# Patient Record
Sex: MALE | Race: BLACK OR AFRICAN AMERICAN | NOT HISPANIC OR LATINO | Employment: OTHER | URBAN - METROPOLITAN AREA
[De-identification: names, ages, dates, MRNs, and addresses within clinical notes are randomized per-mention and may not be internally consistent; named-entity substitution may affect disease eponyms.]

---

## 2018-01-08 PROCEDURE — 93010 ELECTROCARDIOGRAM REPORT: CPT | Mod: ,,, | Performed by: INTERNAL MEDICINE

## 2018-01-08 PROCEDURE — 93005 ELECTROCARDIOGRAM TRACING: CPT

## 2018-01-08 PROCEDURE — 99291 CRITICAL CARE FIRST HOUR: CPT | Mod: 25

## 2018-01-08 PROCEDURE — 82962 GLUCOSE BLOOD TEST: CPT

## 2018-01-09 ENCOUNTER — HOSPITAL ENCOUNTER (INPATIENT)
Facility: HOSPITAL | Age: 78
LOS: 1 days | DRG: 153 | End: 2018-01-10
Attending: EMERGENCY MEDICINE | Admitting: EMERGENCY MEDICINE
Payer: MEDICARE

## 2018-01-09 DIAGNOSIS — J11.1 FLU: ICD-10-CM

## 2018-01-09 DIAGNOSIS — I21.4 NSTEMI (NON-ST ELEVATED MYOCARDIAL INFARCTION): ICD-10-CM

## 2018-01-09 DIAGNOSIS — R50.9 FEVER, UNSPECIFIED FEVER CAUSE: ICD-10-CM
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
## 2018-01-09 DIAGNOSIS — R79.89 ELEVATED TROPONIN: Primary | ICD-10-CM

## 2018-01-09 PROBLEM — I69.320 CVA, OLD, APHASIA: Status: ACTIVE | Noted: 2018-01-09

## 2018-01-09 PROBLEM — J10.1 INFLUENZA A: Status: ACTIVE | Noted: 2018-01-09

## 2018-01-09 PROBLEM — I10 ESSENTIAL HYPERTENSION: Status: ACTIVE | Noted: 2018-01-09

## 2018-01-09 LAB
ALBUMIN SERPL BCP-MCNC: 3.7 G/DL
ALP SERPL-CCNC: 94 U/L
ALT SERPL W/O P-5'-P-CCNC: 23 U/L
ANION GAP SERPL CALC-SCNC: 14 MMOL/L
AST SERPL-CCNC: 61 U/L
BASOPHILS # BLD AUTO: 0.01 K/UL
BASOPHILS NFR BLD: 0.1 %
BILIRUB SERPL-MCNC: 0.4 MG/DL
BUN SERPL-MCNC: 17 MG/DL
CALCIUM SERPL-MCNC: 9.8 MG/DL
CHLORIDE SERPL-SCNC: 105 MMOL/L
CHOLEST SERPL-MCNC: 114 MG/DL
CHOLEST/HDLC SERPL: 3 {RATIO}
CO2 SERPL-SCNC: 21 MMOL/L
CREAT SERPL-MCNC: 1.2 MG/DL
DIASTOLIC DYSFUNCTION: NO
DIFFERENTIAL METHOD: ABNORMAL
EOSINOPHIL # BLD AUTO: 0 K/UL
EOSINOPHIL NFR BLD: 0.1 %
ERYTHROCYTE [DISTWIDTH] IN BLOOD BY AUTOMATED COUNT: 15.3 %
EST. GFR  (AFRICAN AMERICAN): >60 ML/MIN/1.73 M^2
EST. GFR  (NON AFRICAN AMERICAN): 57 ML/MIN/1.73 M^2
ESTIMATED AVG GLUCOSE: 140 MG/DL
ESTIMATED PA SYSTOLIC PRESSURE: 35.67
FLUAV AG SPEC QL IA: POSITIVE
FLUBV AG SPEC QL IA: NEGATIVE
GLOBAL PERICARDIAL EFFUSION: NORMAL
GLUCOSE SERPL-MCNC: 221 MG/DL
HBA1C MFR BLD HPLC: 6.5 %
HCT VFR BLD AUTO: 35.6 %
HDLC SERPL-MCNC: 38 MG/DL
HDLC SERPL: 33.3 %
HGB BLD-MCNC: 11.7 G/DL
INR PPP: 1.1
LACTATE SERPL-SCNC: 3.4 MMOL/L
LDLC SERPL CALC-MCNC: 61.2 MG/DL
LYMPHOCYTES # BLD AUTO: 0.6 K/UL
LYMPHOCYTES NFR BLD: 8.3 %
MAGNESIUM SERPL-MCNC: 1.6 MG/DL
MCH RBC QN AUTO: 28 PG
MCHC RBC AUTO-ENTMCNC: 32.9 G/DL
MCV RBC AUTO: 85 FL
MITRAL VALVE MOBILITY: NORMAL
MONOCYTES # BLD AUTO: 0.9 K/UL
MONOCYTES NFR BLD: 11.9 %
NEUTROPHILS # BLD AUTO: 5.9 K/UL
NEUTROPHILS NFR BLD: 79.6 %
NONHDLC SERPL-MCNC: 76 MG/DL
PLATELET # BLD AUTO: 206 K/UL
PMV BLD AUTO: 10.1 FL
POCT GLUCOSE: 140 MG/DL (ref 70–110)
POCT GLUCOSE: 170 MG/DL (ref 70–110)
POCT GLUCOSE: 204 MG/DL (ref 70–110)
POCT GLUCOSE: 204 MG/DL (ref 70–110)
POCT GLUCOSE: 216 MG/DL (ref 70–110)
POCT GLUCOSE: 233 MG/DL (ref 70–110)
POTASSIUM SERPL-SCNC: 4.4 MMOL/L
PROT SERPL-MCNC: 7.7 G/DL
PROTHROMBIN TIME: 12 SEC
RBC # BLD AUTO: 4.18 M/UL
RETIRED EF AND QEF - SEE NOTES: 60 (ref 55–65)
SODIUM SERPL-SCNC: 140 MMOL/L
SPECIMEN SOURCE: ABNORMAL
TRICUSPID VALVE REGURGITATION: NORMAL
TRIGL SERPL-MCNC: 74 MG/DL
TROPONIN I SERPL DL<=0.01 NG/ML-MCNC: 0.15 NG/ML
TROPONIN I SERPL DL<=0.01 NG/ML-MCNC: 0.22 NG/ML
TROPONIN I SERPL DL<=0.01 NG/ML-MCNC: 0.22 NG/ML
WBC # BLD AUTO: 7.38 K/UL

## 2018-01-09 PROCEDURE — 87040 BLOOD CULTURE FOR BACTERIA: CPT

## 2018-01-09 PROCEDURE — 96374 THER/PROPH/DIAG INJ IV PUSH: CPT

## 2018-01-09 PROCEDURE — 96361 HYDRATE IV INFUSION ADD-ON: CPT

## 2018-01-09 PROCEDURE — 92610 EVALUATE SWALLOWING FUNCTION: CPT

## 2018-01-09 PROCEDURE — 63600175 PHARM REV CODE 636 W HCPCS: Performed by: EMERGENCY MEDICINE

## 2018-01-09 PROCEDURE — 83605 ASSAY OF LACTIC ACID: CPT

## 2018-01-09 PROCEDURE — G8996 SWALLOW CURRENT STATUS: HCPCS | Mod: CI

## 2018-01-09 PROCEDURE — G8998 SWALLOW D/C STATUS: HCPCS | Mod: CI

## 2018-01-09 PROCEDURE — 25000003 PHARM REV CODE 250: Performed by: HOSPITALIST

## 2018-01-09 PROCEDURE — 80061 LIPID PANEL: CPT

## 2018-01-09 PROCEDURE — 93306 TTE W/DOPPLER COMPLETE: CPT

## 2018-01-09 PROCEDURE — 93010 ELECTROCARDIOGRAM REPORT: CPT | Mod: ,,, | Performed by: INTERNAL MEDICINE

## 2018-01-09 PROCEDURE — 36415 COLL VENOUS BLD VENIPUNCTURE: CPT

## 2018-01-09 PROCEDURE — 84484 ASSAY OF TROPONIN QUANT: CPT

## 2018-01-09 PROCEDURE — 83735 ASSAY OF MAGNESIUM: CPT

## 2018-01-09 PROCEDURE — 25000003 PHARM REV CODE 250: Performed by: EMERGENCY MEDICINE

## 2018-01-09 PROCEDURE — 85025 COMPLETE CBC W/AUTO DIFF WBC: CPT

## 2018-01-09 PROCEDURE — 99223 1ST HOSP IP/OBS HIGH 75: CPT | Mod: ,,, | Performed by: INTERNAL MEDICINE

## 2018-01-09 PROCEDURE — 21400001 HC TELEMETRY ROOM

## 2018-01-09 PROCEDURE — 83036 HEMOGLOBIN GLYCOSYLATED A1C: CPT

## 2018-01-09 PROCEDURE — 25000003 PHARM REV CODE 250: Performed by: INTERNAL MEDICINE

## 2018-01-09 PROCEDURE — 85610 PROTHROMBIN TIME: CPT

## 2018-01-09 PROCEDURE — 82962 GLUCOSE BLOOD TEST: CPT

## 2018-01-09 PROCEDURE — 93306 TTE W/DOPPLER COMPLETE: CPT | Mod: 26,,, | Performed by: INTERNAL MEDICINE

## 2018-01-09 PROCEDURE — 87400 INFLUENZA A/B EACH AG IA: CPT

## 2018-01-09 PROCEDURE — G8997 SWALLOW GOAL STATUS: HCPCS | Mod: CI

## 2018-01-09 PROCEDURE — 93005 ELECTROCARDIOGRAM TRACING: CPT

## 2018-01-09 PROCEDURE — 80053 COMPREHEN METABOLIC PANEL: CPT

## 2018-01-09 RX ORDER — INSULIN ASPART 100 [IU]/ML
0-5 INJECTION, SOLUTION INTRAVENOUS; SUBCUTANEOUS
Status: DISCONTINUED | OUTPATIENT
Start: 2018-01-09 | End: 2018-01-10 | Stop reason: HOSPADM

## 2018-01-09 RX ORDER — IBUPROFEN 200 MG
16 TABLET ORAL
Status: DISCONTINUED | OUTPATIENT
Start: 2018-01-09 | End: 2018-01-10 | Stop reason: HOSPADM

## 2018-01-09 RX ORDER — FERROUS GLUCONATE 324(38)MG
324 TABLET ORAL
COMMUNITY

## 2018-01-09 RX ORDER — METOPROLOL TARTRATE 25 MG/1
25 TABLET, FILM COATED ORAL 2 TIMES DAILY
Status: DISCONTINUED | OUTPATIENT
Start: 2018-01-09 | End: 2018-01-10 | Stop reason: HOSPADM

## 2018-01-09 RX ORDER — ACETAMINOPHEN 325 MG/1
650 TABLET ORAL EVERY 6 HOURS PRN
Status: DISCONTINUED | OUTPATIENT
Start: 2018-01-09 | End: 2018-01-10 | Stop reason: HOSPADM

## 2018-01-09 RX ORDER — ATORVASTATIN CALCIUM 40 MG/1
40 TABLET, FILM COATED ORAL DAILY
Status: DISCONTINUED | OUTPATIENT
Start: 2018-01-09 | End: 2018-01-10 | Stop reason: HOSPADM

## 2018-01-09 RX ORDER — OSELTAMIVIR PHOSPHATE 75 MG/1
75 CAPSULE ORAL
Status: COMPLETED | OUTPATIENT
Start: 2018-01-09 | End: 2018-01-09

## 2018-01-09 RX ORDER — ASPIRIN 81 MG/1
81 TABLET ORAL DAILY
Status: DISCONTINUED | OUTPATIENT
Start: 2018-01-09 | End: 2018-01-10 | Stop reason: HOSPADM

## 2018-01-09 RX ORDER — TRAZODONE HYDROCHLORIDE 50 MG/1
50 TABLET ORAL NIGHTLY
Status: DISCONTINUED | OUTPATIENT
Start: 2018-01-09 | End: 2018-01-10 | Stop reason: HOSPADM

## 2018-01-09 RX ORDER — ASPIRIN 81 MG/1
81 TABLET ORAL DAILY
COMMUNITY

## 2018-01-09 RX ORDER — CLONIDINE HYDROCHLORIDE 0.1 MG/1
0.1 TABLET ORAL EVERY 4 HOURS PRN
Status: DISCONTINUED | OUTPATIENT
Start: 2018-01-09 | End: 2018-01-10 | Stop reason: HOSPADM

## 2018-01-09 RX ORDER — ASPIRIN 325 MG
325 TABLET ORAL
Status: COMPLETED | OUTPATIENT
Start: 2018-01-09 | End: 2018-01-09

## 2018-01-09 RX ORDER — ACETAMINOPHEN 160 MG/5ML
650 SOLUTION ORAL
Status: COMPLETED | OUTPATIENT
Start: 2018-01-09 | End: 2018-01-09

## 2018-01-09 RX ORDER — GUAIFENESIN 100 MG/5ML
200 SOLUTION ORAL EVERY 4 HOURS PRN
Status: DISCONTINUED | OUTPATIENT
Start: 2018-01-09 | End: 2018-01-10 | Stop reason: HOSPADM

## 2018-01-09 RX ORDER — TRAZODONE HYDROCHLORIDE 50 MG/1
50 TABLET ORAL NIGHTLY
COMMUNITY

## 2018-01-09 RX ORDER — OSELTAMIVIR PHOSPHATE 75 MG/1
75 CAPSULE ORAL DAILY
Status: DISCONTINUED | OUTPATIENT
Start: 2018-01-09 | End: 2018-01-10 | Stop reason: HOSPADM

## 2018-01-09 RX ORDER — SODIUM CHLORIDE 9 MG/ML
1000 INJECTION, SOLUTION INTRAVENOUS
Status: COMPLETED | OUTPATIENT
Start: 2018-01-09 | End: 2018-01-09

## 2018-01-09 RX ORDER — GLUCAGON 1 MG
1 KIT INJECTION
Status: DISCONTINUED | OUTPATIENT
Start: 2018-01-09 | End: 2018-01-10 | Stop reason: HOSPADM

## 2018-01-09 RX ORDER — IBUPROFEN 200 MG
24 TABLET ORAL
Status: DISCONTINUED | OUTPATIENT
Start: 2018-01-09 | End: 2018-01-10 | Stop reason: HOSPADM

## 2018-01-09 RX ORDER — LISINOPRIL 5 MG/1
10 TABLET ORAL DAILY
Status: DISCONTINUED | OUTPATIENT
Start: 2018-01-09 | End: 2018-01-10 | Stop reason: HOSPADM

## 2018-01-09 RX ORDER — AMLODIPINE BESYLATE 5 MG/1
10 TABLET ORAL DAILY
Status: DISCONTINUED | OUTPATIENT
Start: 2018-01-09 | End: 2018-01-10 | Stop reason: HOSPADM

## 2018-01-09 RX ORDER — WARFARIN 2.5 MG/1
2.5 TABLET ORAL
Status: DISCONTINUED | OUTPATIENT
Start: 2018-01-10 | End: 2018-01-10 | Stop reason: HOSPADM

## 2018-01-09 RX ORDER — LABETALOL HYDROCHLORIDE 5 MG/ML
10 INJECTION, SOLUTION INTRAVENOUS
Status: COMPLETED | OUTPATIENT
Start: 2018-01-09 | End: 2018-01-09

## 2018-01-09 RX ORDER — BENZONATATE 100 MG/1
200 CAPSULE ORAL ONCE
Status: COMPLETED | OUTPATIENT
Start: 2018-01-09 | End: 2018-01-09

## 2018-01-09 RX ADMIN — BENZONATATE 200 MG: 100 CAPSULE ORAL at 04:01

## 2018-01-09 RX ADMIN — ATORVASTATIN CALCIUM 40 MG: 40 TABLET, FILM COATED ORAL at 05:01

## 2018-01-09 RX ADMIN — INSULIN ASPART 2 UNITS: 100 INJECTION, SOLUTION INTRAVENOUS; SUBCUTANEOUS at 06:01

## 2018-01-09 RX ADMIN — SODIUM CHLORIDE 1000 ML: 0.9 INJECTION, SOLUTION INTRAVENOUS at 01:01

## 2018-01-09 RX ADMIN — ACETAMINOPHEN 650 MG: 325 TABLET ORAL at 08:01

## 2018-01-09 RX ADMIN — ASPIRIN 81 MG: 81 TABLET, COATED ORAL at 09:01

## 2018-01-09 RX ADMIN — TRAZODONE HYDROCHLORIDE 50 MG: 50 TABLET ORAL at 08:01

## 2018-01-09 RX ADMIN — ACETAMINOPHEN 649.6 MG: 160 SOLUTION ORAL at 01:01

## 2018-01-09 RX ADMIN — SODIUM CHLORIDE 1000 ML: 0.9 INJECTION, SOLUTION INTRAVENOUS at 06:01

## 2018-01-09 RX ADMIN — LISINOPRIL 10 MG: 5 TABLET ORAL at 08:01

## 2018-01-09 RX ADMIN — OSELTAMIVIR PHOSPHATE 75 MG: 75 CAPSULE ORAL at 04:01

## 2018-01-09 RX ADMIN — LABETALOL HYDROCHLORIDE 10 MG: 5 INJECTION, SOLUTION INTRAVENOUS at 01:01

## 2018-01-09 RX ADMIN — AMLODIPINE BESYLATE 10 MG: 5 TABLET ORAL at 08:01

## 2018-01-09 RX ADMIN — METOPROLOL TARTRATE 25 MG: 25 TABLET ORAL at 08:01

## 2018-01-09 RX ADMIN — ASPIRIN 325 MG ORAL TABLET 325 MG: 325 PILL ORAL at 04:01

## 2018-01-09 NOTE — H&P
[facility] Medicine  History & Physical    Patient Name: Mani Kong  MRN: [id number]  Admission Date: 1/9/2018  Attending Physician: Vin Dewey MD   Primary Care Provider: Chaz Shepherd Jr, MD (Inactive)         Patient information was obtained from patient and ER records.     Subjective:     Principal Problem:Elevated troponin    Chief Complaint:   Chief Complaint   Patient presents with    Fall     Family reports patient fell at nursing home today and hit his head. Pt has a small cut to right forhead. Denies LOC. Also c/o dry cough x 1 mo, and fever, loss of appetite, weakness x 2 days. Pt denies head pain. Pt fell off bed. Pt is paralyze on right side due to stroke. Pt unable to speak         HPI: This 77 y.o M with BRAO, DM type II, HTN and PMHx of stroke presents to the ED from [facility] accompanied by a family member for emergent evaluation of a fall x2 PTA. 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 The pt fell again at approximately 1900, resulting in bruising to the left side chest and small laceration to the right side forehead. The pt's family member reports a fever of 103.0 F PTA. Additionally, the pt's family member reports a gradually worsening cough x1 month. The pt denies LOC, chest pain,SOB,diaphoresis, V/D and headache. No prior tx.he has 0.151 with no chest pain,EKG show non specific T wave changes,he has al;so positive influenza and has been started on Tamiflu.chest X ray show no consolidation.    Past Medical History:   Diagnosis Date    BRAO (branch retinal artery occlusion)     OS    Diabetes mellitus type II     Diabetic macular edema of right eye     Diabetic retinopathy     Encounter for blood transfusion     Glaucoma     High cholesterol     Hypertension     Stroke        Past Surgical History:   Procedure Laterality Date    BACK SURGERY       "CATARACT EXTRACTION W/  INTRAOCULAR LENS IMPLANT Bilateral     PANRETINAL PHOTOCOAGULATION      Right eye x 2 2/27/12 & 3/26/12       Review of patient's allergies indicates:  No Known Allergies    No current facility-administered medications on file prior to encounter.      Current Outpatient Prescriptions on File Prior to Encounter   Medication Sig    amlodipine (NORVASC) 10 MG tablet Take 1 tablet (10 mg total) by mouth once daily.    atorvastatin (LIPITOR) 40 MG tablet Take 1 tablet (40 mg total) by mouth once daily.    metformin (GLUCOPHAGE) 1000 MG tablet Take 1 tablet (1,000 mg total) by mouth 2 (two) times daily.    blood sugar diagnostic Strp Use with Contour Next EZ machine. Test three times daily.    cetirizine (ZYRTEC) 5 MG tablet Take 1 tablet (5 mg total) by mouth once daily.    insulin aspart (NOVOLOG) 100 unit/mL injection Inject 8 units subq TID with meals and ACHS per sliding scale: 0 - 60:OJ or glucose tablet;=No insulin;201-250=2 units;251-300=4 units;301-350=6 units;351-400=8 units;>400=10 units then call MD    insulin glargine (LANTUS SOLOSTAR) 100 unit/mL (3 mL) InPn pen Inject 12 Units into the skin every evening. Take twenty units every evening    insulin needles, disposable, (BD ULTRA-FINE CHETNA PEN NEEDLES) 32 x 5/32 " Ndle Use with flex pens before meals    insulin needles, disposable, (BD ULTRA-FINE CHETNA PEN NEEDLES) 32 x 5/32 " Ndle Use with flexpen    lancets (LANCETS,ULTRA THIN) Misc Use with Contour Next EZ machine. Test three times daily    lisinopril 10 MG tablet Take 1 tablet (10 mg total) by mouth once daily.    SYRINGE W-NEEDLE,DISPOSAB,1ML (SYRINGE WITH NEEDLE, DISP,) 1 mL 29 x 1/2" Syrg Use as directed for insulin dosage before meals and at bedtime    warfarin (COUMADIN) 5 MG tablet Take 1 & 1/2 tab (7.5mg) by mouth Mon-Wed-Fri and 1 tab (5mg) all other days of the week as directed by [facility]     Family History     Problem Relation (Age of Onset)    " Diabetes Sister, Sister, Maternal Grandmother        Social History Main Topics    Smoking status: Former Smoker     Quit date: 10/10/2000    Smokeless tobacco: Never Used    Alcohol use 0.5 oz/week     1 drink(s) per week      Comment: very seldom    Drug use: No    Sexual activity: Not on file     Review of Systems   Constitutional: Positive for activity change and fever.   HENT: Negative for congestion and dental problem.    Eyes: Negative for discharge and itching.   Respiratory: Negative for apnea and chest tightness.    Cardiovascular: Negative for chest pain and palpitations.   Gastrointestinal: Negative for abdominal distention and abdominal pain.   Endocrine: Negative for cold intolerance and heat intolerance.   Genitourinary: Negative for difficulty urinating and dysuria.   Musculoskeletal: Negative for arthralgias and back pain.   Skin: Negative for color change and pallor.   Allergic/Immunologic: Negative for environmental allergies and food allergies.   Neurological: Negative for dizziness.   Hematological: Negative for adenopathy. Does not bruise/bleed easily.   Psychiatric/Behavioral: Negative for agitation and behavioral problems.     Objective:     Vital Signs (Most Recent):  Temp: 100.3 °F (37.9 °C) (01/09/18 0555)  Pulse: 91 (01/09/18 0555)  Resp: 16 (01/08/18 2131)  BP: (!) 126/58 (01/09/18 0555)  SpO2: 96 % (01/09/18 0555) Vital Signs (24h Range):  Temp:  [98.2 °F (36.8 °C)-101.9 °F (38.8 °C)] 100.3 °F (37.9 °C)  Pulse:  [] 91  Resp:  [16] 16  SpO2:  [96 %-100 %] 96 %  BP: (126-178)/(58-79) 126/58     Weight: 67.8 kg (149 lb 7.6 oz)  Body mass index is 23.41 kg/m².    Physical Exam   HENT:   Head: Atraumatic.   Eyes: EOM are normal. Pupils are equal, round, and reactive to light.   Neck: Normal range of motion. Neck supple.   Cardiovascular: Normal rate and regular rhythm.    Pulmonary/Chest: Effort normal and breath sounds normal.   Abdominal: Soft.   Musculoskeletal: Normal range of  motion.   Neurological: He is alert.   Chronic right side paralysis   Skin: Skin is dry. He is not diaphoretic.   Psychiatric: He has a normal mood and affect. His behavior is normal.         CRANIAL NERVES     CN III, IV, VI   Pupils are equal, round, and reactive to light.  Extraocular motions are normal.        Significant Labs:   BMP:   Recent Labs  Lab 01/09/18  0055   *      K 4.4      CO2 21*   BUN 17   CREATININE 1.2   CALCIUM 9.8   MG 1.6     CBC:   Recent Labs  Lab 01/09/18  0130   WBC 7.38   HGB 11.7*   HCT 35.6*        Troponin:   Recent Labs  Lab 01/09/18  0055   TROPONINI 0.151*       Significant Imaging: reviewed.    Assessment/Plan:     * Elevated troponin      suspect duo to fall,he denies chest pain and EKG show only non specific T wave changes,trend cardiac marks,Echo,cardiology has been consulted.        Essential hypertension      Resume home medication,prn clonidine.        Uncontrolled type 2 diabetes mellitus with retinopathy    On SSI.          CVA, old, aphasia    As above.        Influenza A      On Tamiflu,not hypoxic,stable.        Hyperlipidemia    On statin.          Anticoagulation monitoring, INR range 2-3      Subtherapeutic,will monitor.        Hemiparesis, aphasia, and dysphagia as late effects of stroke    Right hemiparesis,on secondary prevention,will do ST,aspiration precaution.          Debility    Nursing home resident,duo to old CVA,.          Aphasia    Chronic,          Branch retinal artery occlusion      Supportive care,follow with ophthalmology.        Nonproliferative diabetic retinopathy    Supportive care.            VTE Risk Mitigation         Ordered     warfarin (COUMADIN) tablet 2.5 mg  Every Mon, Wed, Fri     Route:  Oral        01/09/18 0603     High Risk of VTE  Once      01/09/18 0603     Place ANTONIETA hose  Until discontinued      01/09/18 0603     Place sequential compression device  Until discontinued      01/09/18 0603              Vin Dewey MD  Department of Hospital Medicine   [facility]

## 2018-01-09 NOTE — ED PROVIDER NOTES
Encounter Date: 1/8/2018    SCRIBE #1 NOTE: I, Venu Laws, am scribing for, and in the presence of,  Chris Bravo MD. I have scribed the following portions of the note - Other sections scribed: HPI and ROS.       History     Chief Complaint   Patient presents with    Fall     Family reports patient fell at nursing home today and hit his head. Pt has a small cut to right forhead. Denies LOC. Also c/o dry cough x 1 mo, and fever, loss of appetite, weakness x 2 days. Pt denies head pain. Pt fell off bed. Pt is paralyze on right side due to stroke. Pt unable to speak      CC: Fall     HPI: This 77 y.o M with BRAO, DM type II, HTN and PMHx of stroke presents to the ED from [facility] accompanied by a family member for emergent evaluation of a fall x2 PTA. 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 The pt fell again at approximately 1900, resulting in bruising to the left side chest and small laceration to the right side forehead. The pt's family member reports a fever of 103.0 F PTA. Additionally, the pt's family member reports a gradually worsening cough x1 month. The pt denies LOC, diaphoresis, V/D and headache. No prior tx.       The history is provided by a relative. No  was used.     Review of patient's allergies indicates:  No Known Allergies  Past Medical History:   Diagnosis Date    BRAO (branch retinal artery occlusion)     OS    Diabetes mellitus type II     Diabetic macular edema of right eye     Diabetic retinopathy     Encounter for blood transfusion     Glaucoma     High cholesterol     Hypertension     Stroke      Past Surgical History:   Procedure Laterality Date    BACK SURGERY      CATARACT EXTRACTION W/  INTRAOCULAR LENS IMPLANT Bilateral     PANRETINAL PHOTOCOAGULATION      Right eye x 2 2/27/12 & 3/26/12     Family History   Problem Relation Age of Onset    Diabetes  Sister     Diabetes Sister     Diabetes Maternal Grandmother     Glaucoma Neg Hx     Blindness Neg Hx     Macular degeneration Neg Hx     Retinal detachment Neg Hx      Social History   Substance Use Topics    Smoking status: Former Smoker     Quit date: 10/10/2000    Smokeless tobacco: Never Used    Alcohol use 0.5 oz/week     1 drink(s) per week      Comment: very seldom     Review of Systems   Constitutional: Positive for fever. Negative for chills.   HENT: Negative for rhinorrhea and sore throat.    Eyes: Negative for redness.   Respiratory: Positive for cough.    Cardiovascular: Negative for chest pain.   Gastrointestinal: Negative for abdominal pain, diarrhea, nausea and vomiting.   Genitourinary: Negative for dysuria.   Musculoskeletal: Negative for back pain.   Skin: Negative for color change.        (+) L side chest bruising   Neurological: Negative for syncope and headaches.   Psychiatric/Behavioral: The patient is not nervous/anxious.        Physical Exam     Initial Vitals [01/08/18 2131]   BP Pulse Resp Temp SpO2   (!) 175/79 110 16 98.2 °F (36.8 °C) 99 %      MAP       111         Physical Exam    Vitals reviewed.  Constitutional: He appears well-developed and well-nourished.   HENT:   Head: Normocephalic and atraumatic.   Eyes: EOM are normal. Pupils are equal, round, and reactive to light.   Neck: Normal range of motion. Neck supple.   Cardiovascular: Normal rate, regular rhythm, normal heart sounds and intact distal pulses.   Pulmonary/Chest: Breath sounds normal. No respiratory distress. He has no wheezes. He has no rhonchi. He has no rales.   Patient has a large anterior left chest wall contusion with bruising.   Abdominal: Soft. Bowel sounds are normal.   Musculoskeletal: Normal range of motion.   Neurological: He is alert and oriented to person, place, and time.   Skin: Skin is warm and dry.   Psychiatric: He has a normal mood and affect. He is noncommunicative.   Patient is essentially  nonverbal but is awake, alert and oriented and seems to follow line conversations and will shake his head yes and no to questions.         ED Course   Critical Care  Date/Time: 1/9/2018 2:51 AM  Performed by: GABRIELLE MCKEON  Authorized by: GABRIELLE MCKEON   Total critical care time (exclusive of procedural time) : 43 minutes  Critical care was necessary to treat or prevent imminent or life-threatening deterioration of the following conditions: sepsis and cardiac failure.  Critical care was time spent personally by me on the following activities: review of old charts, re-evaluation of patient's condition, pulse oximetry, ordering and review of radiographic studies, ordering and review of laboratory studies, obtaining history from patient or surrogate, ordering and performing treatments and interventions, examination of patient, evaluation of patient's response to treatment, discussions with consultants and development of treatment plan with patient or surrogate.        Labs Reviewed   CBC W/ AUTO DIFFERENTIAL - Abnormal; Notable for the following:        Result Value    RBC 4.18 (*)     Hemoglobin 11.7 (*)     Hematocrit 35.6 (*)     RDW 15.3 (*)     Lymph # 0.6 (*)     Gran% 79.6 (*)     Lymph% 8.3 (*)     All other components within normal limits   COMPREHENSIVE METABOLIC PANEL - Abnormal; Notable for the following:     CO2 21 (*)     Glucose 221 (*)     AST 61 (*)     eGFR if non  57 (*)     All other components within normal limits   TROPONIN I - Abnormal; Notable for the following:     Troponin I 0.151 (*)     All other components within normal limits   INFLUENZA A AND B ANTIGEN - Abnormal; Notable for the following:     Influenza A Ag, EIA Positive (*)     All other components within normal limits   LACTIC ACID, PLASMA - Abnormal; Notable for the following:     Lactate (Lactic Acid) 3.4 (*)     All other components within normal limits   POCT GLUCOSE - Abnormal; Notable for the  following:     POCT Glucose 233 (*)     All other components within normal limits   POCT GLUCOSE - Abnormal; Notable for the following:     POCT Glucose 216 (*)     All other components within normal limits   CULTURE, BLOOD   CULTURE, BLOOD   PROTIME-INR   MAGNESIUM   URINALYSIS   POCT GLUCOSE MONITORING CONTINUOUS     EKG Readings: (Independently Interpreted)   Rhythm: Normal Sinus Rhythm. Ectopy: No Ectopy. Conduction: Normal. ST Segments: Normal ST Segments. T Waves: Normal. Clinical Impression: Normal Sinus Rhythm       X-Rays:   Independently Interpreted Readings:   Chest X-Ray: Normal heart size.  No infiltrates.  No acute abnormalities.   Head CT: No hemorrhage.  No skull fracture.  No acute stroke.     Medical Decision Making:   History:   Old Medical Records: I decided to obtain old medical records.  Initial Assessment:   Medical decision-making:    The patient received a medical screening exam. If performed, the EKG was independently evaluated by me and is pending final cardiology evaluation.  If performed, all radiographic studies were independently evaluated by me and are pending final radiology evaluation. If labs were ordered, they were reviewed. Vital signs are independently assessed by me.  If performed, the pulse oximetry was independently evaluated by me.  I decided to obtain the patient's past medical record.  If available, I reviewed the patient's past medical record, including most recent labs and radiology reports.    ED Management:  Patient presents to the emergency department for evaluation of the nursing home after a fall while trying to transition from his wheelchair to the bed.  Patient is on Coumadin.  Patient has a normal head CT.  There is no evidence for intracranial hemorrhage or skull fracture.  Patient was found to be febrile.  His influenza test is positive.  Will start Tamiflu.  The patient has a large anterior chest wall contusion.  The patient has a normal EKG, but a grossly  elevated troponin.  Patient shakes his no when we ask him if he is an active chest pain.  This may be due to NSTEMI versus cardiac contusion.  We will admit the patient for serial cardiac markers.  We'll give the patient aspirin.  He is on Coumadin.  Beta blocker, ACE inhibitor and Plavix at the discretion of primary care and cardiology.  Chest x-ray shows no sign of pneumothorax or large focal consolidation or pneumonia.    I discussed the patient's presentation and workup with the hospitalist who agrees with placing the patient in the hospital.  I have placed orders for the hospitalist.   The results and physical exam findings were reviewed with the patient. Pt agrees with assessment, disposition and treatment plan and has no further questions or complaints at this time.    PREETHI Bravo M.D. 2:51 AM 1/9/2018          Imaging Results          X-Ray Chest 1 View (Final result)  Result time 01/09/18 01:31:35    Final result by Walter Lucero MD (01/09/18 01:31:35)                 Impression:      No acute cardiopulmonary process identified.      Electronically signed by: WALTER LUCERO MD  Date:     01/09/18  Time:    01:31              Narrative:    Chest AP single view.  Comparison: 2/2015.    Cardiac silhouette is normal in size.  Mediastinal structures are midline.  Lungs are symmetrically expanded.  No evidence of focal consolidative process, pneumothorax, or significant effusion.  Bones show DJD.  No free air visualized beneath the diaphragm.                             CT Head Without Contrast (Final result)  Result time 01/09/18 01:28:34    Final result by Walter Lucero MD (01/09/18 01:28:34)                 Impression:       No acute intracranial abnormalities identified.  Remote infarction involving the left frontal lobe and basal ganglia.      Electronically signed by: WALTER LUCERO MD  Date:     01/09/18  Time:    01:28              Narrative:    Exam: CT of the head without contrast -- 77  year-old male status post fall.    Comparison: CT head 2/2015.    Technique: 5 mm noncontrast axial images through the brain were obtained.    Findings: There is generalized cerebral volume loss and mild chronic microvascular ischemic disease.  Area of remote infarction is again seen involving the left frontal lobe and basal ganglia.  No evidence of acute/recent major vascular distribution cerebral infarction, intraparenchymal hemorrhage, or intra-axial space occupying lesion. The ventricular system is normal in size and configuration with no evidence of hydrocephalus. No effacement of the skull-base cisterns. No abnormal extra-axial fluid collections or blood products.  Left frontal and ethmoid sinus disease is noted. The calvarium shows no significant abnormality.                            Not be       Scribe Attestation:   Scribe #1: I performed the above scribed service and the documentation accurately describes the services I performed. I attest to the accuracy of the note.    Attending Attestation:           Physician Attestation for Scribe:  Physician Attestation Statement for Scribe #1: I, Chris Bravo MD, reviewed documentation, as scribed by Venu Laws in my presence, and it is both accurate and complete.                 ED Course      Clinical Impression:   The primary encounter diagnosis was NSTEMI (non-ST elevated myocardial infarction). Diagnoses of Fall, Flu, and Fever, unspecified fever cause were also pertinent to this visit.                           Chris Bravo MD  01/09/18 0710

## 2018-01-09 NOTE — PLAN OF CARE
01/09/18 1456   Discharge Assessment   Assessment Type Discharge Planning Assessment   Confirmed/corrected address and phone number on facesheet? Yes  (pt is resident of [facility])   Assessment information obtained from? (pts lis Vanegas)   Prior to hospitilization cognitive status: (pt is aphasic)   Prior to hospitalization functional status: Assistive Equipment;Needs Assistance   Current cognitive status: Alert/Oriented  (pt aphasic)   Current Functional Status: Assistive Equipment;Needs Assistance   Facility Arrived From: [facility] With facility resident  ([facility] )   Able to Return to Prior Arrangements yes   Is patient able to care for self after discharge? Yes   Who are your caregiver(s) and their phone number(s)? lis Vanegas- [phone]   Patient's perception of discharge disposition nursing home   Readmission Within The Last 30 Days no previous admission in last 30 days   Patient currently being followed by outpatient case management? No   Patient currently receives any other outside agency services? No   Equipment Currently Used at Home (facility equipment)   Do you have any problems affording any of your prescribed medications? No   Is the patient taking medications as prescribed? yes   Does the patient have transportation home? Yes   Transportation Available agency transportation;family or friend will provide   Does the patient receive services at the [facility]? No   Discharge Plan A Return to nursing home  ([facility])   Discharge Plan B Return to Nursing Home   Patient/Family In Agreement With Plan yes   Does the patient have transportation to healthcare appointments? Yes     [facility] [address] [place] [address], [place] - [address] [facility]  [address] [facility] [address]  Phone: [phone] Fax: [phone]    Assessment completed with pts lis Vanegas who informed TN that the pt will be returning back to [facility] at time of discharge. No additional questions or  concerns TN name and contact information provided to pts daughter.

## 2018-01-09 NOTE — ASSESSMENT & PLAN NOTE
Likely demand is setting of fever/flu, doubt ACS  Check echo  Agree with med rx  No plan for ischemia eval/stress testing (I do not think the pt is an appropriate candidate for revasc given his dementia)

## 2018-01-09 NOTE — ASSESSMENT & PLAN NOTE
suspect duo to fall,he denies chest pain and EKG show only non specific T wave changes,trend cardiac marks,Echo,cardiology has been consulted.

## 2018-01-09 NOTE — SUBJECTIVE & OBJECTIVE
"Past Medical History:   Diagnosis Date    BRAO (branch retinal artery occlusion)     OS    Diabetes mellitus type II     Diabetic macular edema of right eye     Diabetic retinopathy     Encounter for blood transfusion     Glaucoma     High cholesterol     Hypertension     Stroke        Past Surgical History:   Procedure Laterality Date    BACK SURGERY      CATARACT EXTRACTION W/  INTRAOCULAR LENS IMPLANT Bilateral     PANRETINAL PHOTOCOAGULATION      Right eye x 2 2/27/12 & 3/26/12       Review of patient's allergies indicates:  No Known Allergies    No current facility-administered medications on file prior to encounter.      Current Outpatient Prescriptions on File Prior to Encounter   Medication Sig    amlodipine (NORVASC) 10 MG tablet Take 1 tablet (10 mg total) by mouth once daily.    atorvastatin (LIPITOR) 40 MG tablet Take 1 tablet (40 mg total) by mouth once daily.    metformin (GLUCOPHAGE) 1000 MG tablet Take 1 tablet (1,000 mg total) by mouth 2 (two) times daily.    blood sugar diagnostic Strp Use with [employer] EZ machine. Test three times daily.    cetirizine (ZYRTEC) 5 MG tablet Take 1 tablet (5 mg total) by mouth once daily.    insulin aspart (NOVOLOG) 100 unit/mL injection Inject 8 units subq TID with meals and ACHS per sliding scale: 0 - 60:OJ or glucose tablet;=No insulin;201-250=2 units;251-300=4 units;301-350=6 units;351-400=8 units;>400=10 units then call MD    insulin glargine (LANTUS SOLOSTAR) 100 unit/mL (3 mL) InPn pen Inject 12 Units into the skin every evening. Take twenty units every evening    insulin needles, disposable, (BD ULTRA-FINE CHETNA PEN NEEDLES) 32 x 5/32 " Ndle Use with flex pens before meals    insulin needles, disposable, (BD ULTRA-FINE CHETNA PEN NEEDLES) 32 x 5/32 " Ndle Use with flexpen    lancets (LANCETS,ULTRA THIN) Misc Use with [employer] Next EZ machine. Test three times daily    lisinopril 10 MG tablet Take 1 tablet (10 mg total) by mouth " "once daily.    SYRINGE W-NEEDLE,DISPOSAB,1ML (SYRINGE WITH NEEDLE, DISP,) 1 mL 29 x 1/2" Syrg Use as directed for insulin dosage before meals and at bedtime    warfarin (COUMADIN) 5 MG tablet Take 1 & 1/2 tab (7.5mg) by mouth Mon-Wed-Fri and 1 tab (5mg) all other days of the week as directed by [facility]     Family History     Problem Relation (Age of Onset)    Diabetes Sister, Sister, Maternal Grandmother        Social History Main Topics    Smoking status: Former Smoker     Quit date: 10/10/2000    Smokeless tobacco: Never Used    Alcohol use 0.5 oz/week     1 drink(s) per week      Comment: very seldom    Drug use: No    Sexual activity: Not on file     Review of Systems   Constitutional: Positive for activity change and fever.   HENT: Negative for congestion and dental problem.    Eyes: Negative for discharge and itching.   Respiratory: Negative for apnea and chest tightness.    Cardiovascular: Negative for chest pain and palpitations.   Gastrointestinal: Negative for abdominal distention and abdominal pain.   Endocrine: Negative for cold intolerance and heat intolerance.   Genitourinary: Negative for difficulty urinating and dysuria.   Musculoskeletal: Negative for arthralgias and back pain.   Skin: Negative for color change and pallor.   Allergic/Immunologic: Negative for environmental allergies and food allergies.   Neurological: Negative for dizziness.   Hematological: Negative for adenopathy. Does not bruise/bleed easily.   Psychiatric/Behavioral: Negative for agitation and behavioral problems.     Objective:     Vital Signs (Most Recent):  Temp: 100.3 °F (37.9 °C) (01/09/18 0555)  Pulse: 91 (01/09/18 0555)  Resp: 16 (01/08/18 2131)  BP: (!) 126/58 (01/09/18 0555)  SpO2: 96 % (01/09/18 0555) Vital Signs (24h Range):  Temp:  [98.2 °F (36.8 °C)-101.9 °F (38.8 °C)] 100.3 °F (37.9 °C)  Pulse:  [] 91  Resp:  [16] 16  SpO2:  [96 %-100 %] 96 %  BP: (126-178)/(58-79) 126/58     Weight: 67.8 kg " (149 lb 7.6 oz)  Body mass index is 23.41 kg/m².    Physical Exam   HENT:   Head: Atraumatic.   Eyes: EOM are normal. Pupils are equal, round, and reactive to light.   Neck: Normal range of motion. Neck supple.   Cardiovascular: Normal rate and regular rhythm.    Pulmonary/Chest: Effort normal and breath sounds normal.   Abdominal: Soft.   Musculoskeletal: Normal range of motion.   Neurological: He is alert.   Chronic right side paralysis   Skin: Skin is dry. He is not diaphoretic.   Psychiatric: He has a normal mood and affect. His behavior is normal.         CRANIAL NERVES     CN III, IV, VI   Pupils are equal, round, and reactive to light.  Extraocular motions are normal.        Significant Labs:   BMP:   Recent Labs  Lab 01/09/18  0055   *      K 4.4      CO2 21*   BUN 17   CREATININE 1.2   CALCIUM 9.8   MG 1.6     CBC:   Recent Labs  Lab 01/09/18  0130   WBC 7.38   HGB 11.7*   HCT 35.6*        Troponin:   Recent Labs  Lab 01/09/18  0055   TROPONINI 0.151*       Significant Imaging: reviewed.

## 2018-01-09 NOTE — PROGRESS NOTES
Attempted to contact pts daughter Eugenia Boo at [phone] no answer at this time voice message left requesting call back to discuss d/c plan.  TN to continue to follow

## 2018-01-09 NOTE — SUBJECTIVE & OBJECTIVE
"Past Medical History:   Diagnosis Date    BRAO (branch retinal artery occlusion)     OS    Diabetes mellitus type II     Diabetic macular edema of right eye     Diabetic retinopathy     Encounter for blood transfusion     Glaucoma     High cholesterol     Hypertension     Stroke        Past Surgical History:   Procedure Laterality Date    BACK SURGERY      CATARACT EXTRACTION W/  INTRAOCULAR LENS IMPLANT Bilateral     PANRETINAL PHOTOCOAGULATION      Right eye x 2 2/27/12 & 3/26/12       Review of patient's allergies indicates:  No Known Allergies    No current facility-administered medications on file prior to encounter.      Current Outpatient Prescriptions on File Prior to Encounter   Medication Sig    amlodipine (NORVASC) 10 MG tablet Take 1 tablet (10 mg total) by mouth once daily.    atorvastatin (LIPITOR) 40 MG tablet Take 1 tablet (40 mg total) by mouth once daily.    metformin (GLUCOPHAGE) 1000 MG tablet Take 1 tablet (1,000 mg total) by mouth 2 (two) times daily.    blood sugar diagnostic Strp Use with [employer] EZ machine. Test three times daily.    cetirizine (ZYRTEC) 5 MG tablet Take 1 tablet (5 mg total) by mouth once daily.    insulin aspart (NOVOLOG) 100 unit/mL injection Inject 8 units subq TID with meals and ACHS per sliding scale: 0 - 60:OJ or glucose tablet;=No insulin;201-250=2 units;251-300=4 units;301-350=6 units;351-400=8 units;>400=10 units then call MD    insulin glargine (LANTUS SOLOSTAR) 100 unit/mL (3 mL) InPn pen Inject 12 Units into the skin every evening. Take twenty units every evening    insulin needles, disposable, (BD ULTRA-FINE CHETNA PEN NEEDLES) 32 x 5/32 " Ndle Use with flex pens before meals    insulin needles, disposable, (BD ULTRA-FINE CHETNA PEN NEEDLES) 32 x 5/32 " Ndle Use with flexpen    lancets (LANCETS,ULTRA THIN) Misc Use with [employer] EZ machine. Test three times daily    lisinopril 10 MG tablet Take 1 tablet (10 mg total) by mouth " "once daily.    SYRINGE W-NEEDLE,DISPOSAB,1ML (SYRINGE WITH NEEDLE, DISP,) 1 mL 29 x 1/2" Syrg Use as directed for insulin dosage before meals and at bedtime    warfarin (COUMADIN) 5 MG tablet Take 1 & 1/2 tab (7.5mg) by mouth Mon-Wed-Fri and 1 tab (5mg) all other days of the week as directed by [facility]     Family History     Problem Relation (Age of Onset)    Diabetes Sister, Sister, Maternal Grandmother        Social History Main Topics    Smoking status: Former Smoker     Quit date: 10/10/2000    Smokeless tobacco: Never Used    Alcohol use 0.5 oz/week     1 drink(s) per week      Comment: very seldom    Drug use: No    Sexual activity: Not on file     Review of Systems   Unable to perform ROS: dementia     Objective:     Vital Signs (Most Recent):  Temp: 98.3 °F (36.8 °C) (01/09/18 0825)  Pulse: 82 (01/09/18 0825)  Resp: 18 (01/09/18 0825)  BP: (!) 157/69 (01/09/18 0825)  SpO2: 99 % (01/09/18 0825) Vital Signs (24h Range):  Temp:  [98.2 °F (36.8 °C)-101.9 °F (38.8 °C)] 98.3 °F (36.8 °C)  Pulse:  [] 82  Resp:  [16-18] 18  SpO2:  [96 %-100 %] 99 %  BP: (126-178)/(58-79) 157/69     Weight: 67.8 kg (149 lb 7.6 oz)  Body mass index is 23.41 kg/m².    SpO2: 99 %  O2 Device (Oxygen Therapy): room air      Intake/Output Summary (Last 24 hours) at 01/09/18 0849  Last data filed at 01/09/18 0300   Gross per 24 hour   Intake             1000 ml   Output                0 ml   Net             1000 ml       Lines/Drains/Airways     Peripheral Intravenous Line                 Peripheral IV - Single Lumen 01/09/18 0053 Left Hand less than 1 day                Physical Exam   Constitutional: He is oriented to person, place, and time. He appears well-developed and well-nourished.   HENT:   Head: Normocephalic and atraumatic.   Eyes: Conjunctivae and EOM are normal. Pupils are equal, round, and reactive to light. No scleral icterus.   Neck: Neck supple. No JVD present. Carotid bruit is not present. No tracheal " deviation present. No thyromegaly present.   Cardiovascular: Normal rate, regular rhythm, S1 normal, S2 normal and intact distal pulses.  Exam reveals no gallop and no friction rub.    No murmur heard.  Pulmonary/Chest: Effort normal and breath sounds normal. He has no wheezes. He exhibits no tenderness.   Abdominal: Soft. Bowel sounds are normal. He exhibits no distension.   Musculoskeletal: He exhibits no edema.   Neurological: He is alert and oriented to person, place, and time. He has normal strength. No cranial nerve deficit.   Skin: Skin is warm and dry. No rash noted.   Psychiatric: He has a normal mood and affect. His behavior is normal.       Current Medications:   amLODIPine  10 mg Oral Daily    aspirin  81 mg Oral Daily    lisinopril  10 mg Oral Daily    metoprolol tartrate  25 mg Oral BID    oseltamivir  75 mg Oral Daily    traZODone  50 mg Oral QHS    [START ON 1/10/2018] warfarin  2.5 mg Oral Every Mon, Wed, Fri       acetaminophen, cloNIDine, dextrose 50%, dextrose 50%, glucagon (human recombinant), glucose, glucose, guaifenesin 100 mg/5 ml, insulin aspart    Laboratory:  CBC:    Recent Labs  Lab 03/05/15  0537 03/09/15  0617 01/09/18  0130   WHITE BLOOD CELL COUNT 5.26 5.21 7.38   HEMOGLOBIN 12.6 L 12.5 L 11.7 L   HEMATOCRIT 39.5 L 40.3 35.6 L   PLATELETS 235 260 206       CHEMISTRIES:    Recent Labs  Lab 03/05/15  0537 03/09/15  0617 01/09/18  0055   GLUCOSE 147 H 120 H 221 H   SODIUM 139 144 140   POTASSIUM 4.0 4.1 4.4   BUN BLD 13 17 17   CREATININE 1.0 1.0 1.2   EGFR IF  >60.0 >60.0 >60   EGFR IF NON- >60.0 >60.0 57 A   CALCIUM 9.6 9.7 9.8   MAGNESIUM 2.1 2.0 1.6       CARDIAC BIOMARKERS:    Recent Labs  Lab 01/09/18  0055 01/09/18  0642   TROPONIN I 0.151 H 0.219 H       COAGS:    Recent Labs  Lab 03/05/15  0537 03/09/15  0618 01/09/18  0130   INR 3.0 H 2.8 H 1.1       LIPIDS/LFTS:    Recent Labs  Lab 02/12/15  1111  02/23/15  0514 02/24/15  0327  01/09/18  0055   CHOLESTEROL 233 H  --   --   --   --    TRIGLYCERIDES 160 H  --   --   --   --    HDL 40  --   --   --   --    LDL CHOLESTEROL 161.0 H  --   --   --   --    NON-HDL CHOLESTEROL 193  --   --   --   --    AST 21  < > 12 14 61 H   ALT 14  < > 11 9 L 23   < > = values in this interval not displayed.        Diagnostic Results:  ECG (personally reviewed tracings):   1/9/18 0243 SR 88, IMI-age indet, lat TWI    Echo: 2/13/15 (repeat pending)    1 - Normal left ventricular systolic function (EF 55-60%).     2 - Left ventricular diastolic dysfunction (grade 1, abnormal relaxation).     3 - Normal right ventricular systolic function .     4 - Mild mitral regurgitation.     5 - Mild tricuspid regurgitation.     6 - Low central venous pressure.

## 2018-01-09 NOTE — ED TRIAGE NOTES
77 y.o male presents to the ED via personal transport from [facility] w/ daughter. The patient's daughter reports the patient fell two times today, which is unusual for him. Pt has bruising to his left chest and a small laceration noted to his right posterior head. Patient denies pain. The patients daughter reports the patient has had a frequent, nonproductive cough since December. The patient's daughter also reports for the past two days the patient has been c/o generalized body aches/ fattigue w/ positive chills. The patient is aphasic and has right sided paralysis from stroke hx.

## 2018-01-09 NOTE — CONSULTS
[facility]  Cardiology  Consult Note    Patient Name: Mani Kong  MRN: [id number]  Admission Date: 1/9/2018  Hospital Length of Stay: 0 days  Code Status: Full Code   Attending Provider: Vin Dewey MD   Consulting Provider: Gal Driver MD  Primary Care Physician: Chaz Shepherd Jr, MD (Inactive)  Principal Problem:Elevated troponin    Patient information was obtained from ER records.     Inpatient consult to Cardiology  Consult performed by: GAL DRIVER  Consult ordered by: GABRIELLE MCKEON  Reason for consult: elev trop        Subjective:     Chief Complaint:  fall     HPI:   77 y.o M with BRAO, DM type II, HTN and PMHx of stroke presents to the ED from [facility] accompanied by a family member for emergent evaluation of a fall x2 PTA. 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 The pt fell again at approximately 1900, resulting in bruising to the left side chest and small laceration to the right side forehead. The pt's family member reports a fever of 103.0 F PTA. Additionally, the pt's family member reports a gradually worsening cough x1 month. The pt denies LOC, chest pain,SOB,diaphoresis, V/D and headache. No prior tx.he has 0.151 with no chest pain,EKG show non specific T wave changes,he has al;so positive influenza and has been started on Tamiflu.chest X ray show no consolidation.    Pt unable to provide any useful hx.  He appears to have flu with significant fever.  His trop is elevated, but flat.  EKG mildly abnormal.  He does not complain of CP.  While I presume he has CAD, I doubt this is ACS, more likely demand.  Additionally, I question the pt's appropriateness for revasc given his dementia.    Past Medical History:   Diagnosis Date    BRAO (branch retinal artery occlusion)     OS    Diabetes mellitus type II     Diabetic macular edema of right eye     Diabetic  "retinopathy     Encounter for blood transfusion     Glaucoma     High cholesterol     Hypertension     Stroke        Past Surgical History:   Procedure Laterality Date    BACK SURGERY      CATARACT EXTRACTION W/  INTRAOCULAR LENS IMPLANT Bilateral     PANRETINAL PHOTOCOAGULATION      Right eye x 2 2/27/12 & 3/26/12       Review of patient's allergies indicates:  No Known Allergies    No current facility-administered medications on file prior to encounter.      Current Outpatient Prescriptions on File Prior to Encounter   Medication Sig    amlodipine (NORVASC) 10 MG tablet Take 1 tablet (10 mg total) by mouth once daily.    atorvastatin (LIPITOR) 40 MG tablet Take 1 tablet (40 mg total) by mouth once daily.    metformin (GLUCOPHAGE) 1000 MG tablet Take 1 tablet (1,000 mg total) by mouth 2 (two) times daily.    blood sugar diagnostic Strp Use with [employer] EZ machine. Test three times daily.    cetirizine (ZYRTEC) 5 MG tablet Take 1 tablet (5 mg total) by mouth once daily.    insulin aspart (NOVOLOG) 100 unit/mL injection Inject 8 units subq TID with meals and ACHS per sliding scale: 0 - 60:OJ or glucose tablet;=No insulin;201-250=2 units;251-300=4 units;301-350=6 units;351-400=8 units;>400=10 units then call MD    insulin glargine (LANTUS SOLOSTAR) 100 unit/mL (3 mL) InPn pen Inject 12 Units into the skin every evening. Take twenty units every evening    insulin needles, disposable, (BD ULTRA-FINE CHETNA PEN NEEDLES) 32 x 5/32 " Ndle Use with flex pens before meals    insulin needles, disposable, (BD ULTRA-FINE CHETNA PEN NEEDLES) 32 x 5/32 " Ndle Use with flexpen    lancets (LANCETS,ULTRA THIN) Misc Use with [employer] Next EZ machine. Test three times daily    lisinopril 10 MG tablet Take 1 tablet (10 mg total) by mouth once daily.    SYRINGE W-NEEDLE,DISPOSAB,1ML (SYRINGE WITH NEEDLE, DISP,) 1 mL 29 x 1/2" Syrg Use as directed for insulin dosage before meals and at bedtime    warfarin " (COUMADIN) 5 MG tablet Take 1 & 1/2 tab (7.5mg) by mouth Mon-Wed-Fri and 1 tab (5mg) all other days of the week as directed by [facility]     Family History     Problem Relation (Age of Onset)    Diabetes Sister, Sister, Maternal Grandmother        Social History Main Topics    Smoking status: Former Smoker     Quit date: 10/10/2000    Smokeless tobacco: Never Used    Alcohol use 0.5 oz/week     1 drink(s) per week      Comment: very seldom    Drug use: No    Sexual activity: Not on file     Review of Systems   Unable to perform ROS: dementia     Objective:     Vital Signs (Most Recent):  Temp: 98.3 °F (36.8 °C) (01/09/18 0825)  Pulse: 82 (01/09/18 0825)  Resp: 18 (01/09/18 0825)  BP: (!) 157/69 (01/09/18 0825)  SpO2: 99 % (01/09/18 0825) Vital Signs (24h Range):  Temp:  [98.2 °F (36.8 °C)-101.9 °F (38.8 °C)] 98.3 °F (36.8 °C)  Pulse:  [] 82  Resp:  [16-18] 18  SpO2:  [96 %-100 %] 99 %  BP: (126-178)/(58-79) 157/69     Weight: 67.8 kg (149 lb 7.6 oz)  Body mass index is 23.41 kg/m².    SpO2: 99 %  O2 Device (Oxygen Therapy): room air      Intake/Output Summary (Last 24 hours) at 01/09/18 0849  Last data filed at 01/09/18 0300   Gross per 24 hour   Intake             1000 ml   Output                0 ml   Net             1000 ml       Lines/Drains/Airways     Peripheral Intravenous Line                 Peripheral IV - Single Lumen 01/09/18 0053 Left Hand less than 1 day                Physical Exam   Constitutional: He is oriented to person, place, and time. He appears well-developed and well-nourished.   HENT:   Head: Normocephalic and atraumatic.   Eyes: Conjunctivae and EOM are normal. Pupils are equal, round, and reactive to light. No scleral icterus.   Neck: Neck supple. No JVD present. Carotid bruit is not present. No tracheal deviation present. No thyromegaly present.   Cardiovascular: Normal rate, regular rhythm, S1 normal, S2 normal and intact distal pulses.  Exam reveals no gallop and no  friction rub.    No murmur heard.  Pulmonary/Chest: Effort normal and breath sounds normal. He has no wheezes. He exhibits no tenderness.   Abdominal: Soft. Bowel sounds are normal. He exhibits no distension.   Musculoskeletal: He exhibits no edema.   Neurological: He is alert and oriented to person, place, and time. He has normal strength. No cranial nerve deficit.   Skin: Skin is warm and dry. No rash noted.   Psychiatric: He has a normal mood and affect. His behavior is normal.       Current Medications:   amLODIPine  10 mg Oral Daily    aspirin  81 mg Oral Daily    lisinopril  10 mg Oral Daily    metoprolol tartrate  25 mg Oral BID    oseltamivir  75 mg Oral Daily    traZODone  50 mg Oral QHS    [START ON 1/10/2018] warfarin  2.5 mg Oral Every Mon, Wed, Fri       acetaminophen, cloNIDine, dextrose 50%, dextrose 50%, glucagon (human recombinant), glucose, glucose, guaifenesin 100 mg/5 ml, insulin aspart    Laboratory:  CBC:    Recent Labs  Lab 03/05/15  0537 03/09/15  0617 01/09/18  0130   WHITE BLOOD CELL COUNT 5.26 5.21 7.38   HEMOGLOBIN 12.6 L 12.5 L 11.7 L   HEMATOCRIT 39.5 L 40.3 35.6 L   PLATELETS 235 260 206       CHEMISTRIES:    Recent Labs  Lab 03/05/15  0537 03/09/15  0617 01/09/18  0055   GLUCOSE 147 H 120 H 221 H   SODIUM 139 144 140   POTASSIUM 4.0 4.1 4.4   BUN BLD 13 17 17   CREATININE 1.0 1.0 1.2   EGFR IF  >60.0 >60.0 >60   EGFR IF NON- >60.0 >60.0 57 A   CALCIUM 9.6 9.7 9.8   MAGNESIUM 2.1 2.0 1.6       CARDIAC BIOMARKERS:    Recent Labs  Lab 01/09/18  0055 01/09/18  0642   TROPONIN I 0.151 H 0.219 H       COAGS:    Recent Labs  Lab 03/05/15  0537 03/09/15  0618 01/09/18  0130   INR 3.0 H 2.8 H 1.1       LIPIDS/LFTS:    Recent Labs  Lab 02/12/15  1111  02/23/15  0514 02/24/15  0327 01/09/18  0055   CHOLESTEROL 233 H  --   --   --   --    TRIGLYCERIDES 160 H  --   --   --   --    HDL 40  --   --   --   --    LDL CHOLESTEROL 161.0 H  --   --   --   --     NON-HDL CHOLESTEROL 193  --   --   --   --    AST 21  < > 12 14 61 H   ALT 14  < > 11 9 L 23   < > = values in this interval not displayed.        Diagnostic Results:  ECG (personally reviewed tracings):   1/9/18 0243 SR 88, IMI-age indet, lat TWI    Echo: 2/13/15 (repeat pending)    1 - Normal left ventricular systolic function (EF 55-60%).     2 - Left ventricular diastolic dysfunction (grade 1, abnormal relaxation).     3 - Normal right ventricular systolic function .     4 - Mild mitral regurgitation.     5 - Mild tricuspid regurgitation.     6 - Low central venous pressure.       Assessment and Plan:     * Elevated troponin    Likely demand is setting of fever/flu, doubt ACS  Check echo  Agree with med rx  No plan for ischemia eval/stress testing (I do not think the pt is an appropriate candidate for revasc given his dementia)        Influenza A    Per IM        Uncontrolled type 2 diabetes mellitus with retinopathy    Per IM        Essential hypertension    Cont med rx        Hyperlipidemia    Resume atorva 40mg            VTE Risk Mitigation         Ordered     warfarin (COUMADIN) tablet 2.5 mg  Every Mon, Wed, Fri     Route:  Oral        01/09/18 0603     High Risk of VTE  Once      01/09/18 0603     Place ANTONIETA hose  Until discontinued      01/09/18 0603     Place sequential compression device  Until discontinued      01/09/18 0603          Thank you for your consult. I will follow-up with patient. Please contact us if you have any additional questions.    Gal Jimenez MD  Cardiology   [facility]      Addendum 5pm    Echo:    1 - Normal left ventricular systolic function (EF 60-65%).     2 - No wall motion abnormalities.     3 - Concentric remodeling.     4 - Trivial to mild tricuspid regurgitation.     5 - The estimated PA systolic pressure is 36 mmHg.    Cardiology will sign off, pls call with questions.

## 2018-01-09 NOTE — ED NOTES
Asked patient if he needed to urinate, patient shook his head no, checked patient's diaper, patient is dry

## 2018-01-09 NOTE — HPI
77 y.o M with BRAO, DM type II, HTN and PMHx of stroke presents to the ED from [facility] accompanied by a family member for emergent evaluation of a fall x2 PTA. 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 The pt fell again at approximately 1900, resulting in bruising to the left side chest and small laceration to the right side forehead. The pt's family member reports a fever of 103.0 F PTA. Additionally, the pt's family member reports a gradually worsening cough x1 month. The pt denies LOC, chest pain,SOB,diaphoresis, V/D and headache. No prior tx.he has 0.151 with no chest pain,EKG show non specific T wave changes,he has al;so positive influenza and has been started on Tamiflu.chest X ray show no consolidation.    Pt unable to provide any useful hx.  He appears to have flu with significant fever.  His trop is elevated, but flat.  EKG mildly abnormal.  He does not complain of CP.  While I presume he has CAD, I doubt this is ACS, more likely demand.  Additionally, I question the pt's appropriateness for revasc given his dementia.

## 2018-01-09 NOTE — HPI
This 77 y.o M with BRAO, DM type II, HTN and PMHx of stroke presents to the ED from [facility] accompanied by a family member for emergent evaluation of a fall x2 PTA. 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 The pt fell again at approximately 1900, resulting in bruising to the left side chest and small laceration to the right side forehead. The pt's family member reports a fever of 103.0 F PTA. Additionally, the pt's family member reports a gradually worsening cough x1 month. The pt denies LOC, chest pain,SOB,diaphoresis, V/D and headache. No prior tx.he has 0.151 with no chest pain,EKG show non specific T wave changes,he has al;so positive influenza and has been started on Tamiflu.chest X ray show no consolidation.

## 2018-01-10 VITALS
HEIGHT: 67 IN | HEART RATE: 63 BPM | DIASTOLIC BLOOD PRESSURE: 54 MMHG | OXYGEN SATURATION: 96 % | WEIGHT: 149.5 LBS | TEMPERATURE: 98 F | BODY MASS INDEX: 23.46 KG/M2 | RESPIRATION RATE: 16 BRPM | SYSTOLIC BLOOD PRESSURE: 102 MMHG

## 2018-01-10 LAB
ANION GAP SERPL CALC-SCNC: 8 MMOL/L
BASOPHILS # BLD AUTO: 0.01 K/UL
BASOPHILS NFR BLD: 0.3 %
BUN SERPL-MCNC: 14 MG/DL
CALCIUM SERPL-MCNC: 8.7 MG/DL
CHLORIDE SERPL-SCNC: 110 MMOL/L
CO2 SERPL-SCNC: 26 MMOL/L
CREAT SERPL-MCNC: 1 MG/DL
DIFFERENTIAL METHOD: ABNORMAL
EOSINOPHIL # BLD AUTO: 0.1 K/UL
EOSINOPHIL NFR BLD: 1.9 %
ERYTHROCYTE [DISTWIDTH] IN BLOOD BY AUTOMATED COUNT: 15.3 %
EST. GFR  (AFRICAN AMERICAN): >60 ML/MIN/1.73 M^2
EST. GFR  (NON AFRICAN AMERICAN): >60 ML/MIN/1.73 M^2
GLUCOSE SERPL-MCNC: 134 MG/DL
HCT VFR BLD AUTO: 32.2 %
HGB BLD-MCNC: 10.7 G/DL
INR PPP: 1.1
LYMPHOCYTES # BLD AUTO: 1.1 K/UL
LYMPHOCYTES NFR BLD: 30.5 %
MCH RBC QN AUTO: 28.5 PG
MCHC RBC AUTO-ENTMCNC: 33.2 G/DL
MCV RBC AUTO: 86 FL
MONOCYTES # BLD AUTO: 0.4 K/UL
MONOCYTES NFR BLD: 11.1 %
NEUTROPHILS # BLD AUTO: 2 K/UL
NEUTROPHILS NFR BLD: 56.2 %
PLATELET # BLD AUTO: 185 K/UL
PMV BLD AUTO: 10.9 FL
POCT GLUCOSE: 145 MG/DL (ref 70–110)
POCT GLUCOSE: 211 MG/DL (ref 70–110)
POTASSIUM SERPL-SCNC: 3.9 MMOL/L
PROTHROMBIN TIME: 11.2 SEC
RBC # BLD AUTO: 3.76 M/UL
SODIUM SERPL-SCNC: 144 MMOL/L
WBC # BLD AUTO: 3.61 K/UL

## 2018-01-10 PROCEDURE — 85025 COMPLETE CBC W/AUTO DIFF WBC: CPT

## 2018-01-10 PROCEDURE — 80048 BASIC METABOLIC PNL TOTAL CA: CPT

## 2018-01-10 PROCEDURE — 25000003 PHARM REV CODE 250: Performed by: INTERNAL MEDICINE

## 2018-01-10 PROCEDURE — 25000003 PHARM REV CODE 250: Performed by: EMERGENCY MEDICINE

## 2018-01-10 PROCEDURE — 36415 COLL VENOUS BLD VENIPUNCTURE: CPT

## 2018-01-10 PROCEDURE — 25000003 PHARM REV CODE 250: Performed by: HOSPITALIST

## 2018-01-10 PROCEDURE — 85610 PROTHROMBIN TIME: CPT

## 2018-01-10 RX ORDER — METOPROLOL TARTRATE 25 MG/1
25 TABLET, FILM COATED ORAL 2 TIMES DAILY
Qty: 60 TABLET | Refills: 11
Start: 2018-01-10 | End: 2019-01-10

## 2018-01-10 RX ORDER — OSELTAMIVIR PHOSPHATE 75 MG/1
75 CAPSULE ORAL DAILY
Qty: 8 CAPSULE | Refills: 0
Start: 2018-01-11 | End: 2018-01-15

## 2018-01-10 RX ADMIN — OSELTAMIVIR PHOSPHATE 75 MG: 75 CAPSULE ORAL at 09:01

## 2018-01-10 RX ADMIN — LISINOPRIL 10 MG: 5 TABLET ORAL at 09:01

## 2018-01-10 RX ADMIN — ASPIRIN 81 MG: 81 TABLET, COATED ORAL at 09:01

## 2018-01-10 RX ADMIN — ATORVASTATIN CALCIUM 40 MG: 40 TABLET, FILM COATED ORAL at 09:01

## 2018-01-10 RX ADMIN — METOPROLOL TARTRATE 25 MG: 25 TABLET ORAL at 09:01

## 2018-01-10 RX ADMIN — AMLODIPINE BESYLATE 10 MG: 5 TABLET ORAL at 09:01

## 2018-01-10 NOTE — PT/OT/SLP EVAL
Speech Language Pathology Evaluation  Bedside Swallow    Patient Name:  Mani Kong   MRN:  [id number]  Admitting Diagnosis: Elevated troponin    Recommendations:                 General Recommendations:  Follow-up not indicated  Diet recommendations:  Dental Soft, Thin   Aspiration Precautions: HOB to 90 degrees   General Precautions: Standard, aphasia  Communication strategies:  simple sentences    History:     Past Medical History:   Diagnosis Date    BRAO (branch retinal artery occlusion)     OS    Diabetes mellitus type II     Diabetic macular edema of right eye     Diabetic retinopathy     Encounter for blood transfusion     Glaucoma     High cholesterol     Hypertension     Stroke        Past Surgical History:   Procedure Laterality Date    BACK SURGERY      CATARACT EXTRACTION W/  INTRAOCULAR LENS IMPLANT Bilateral     PANRETINAL PHOTOCOAGULATION      Right eye x 2 2/27/12 & 3/26/12       Social History: Patient lives at [facility]    Modified Barium Swallow: 2015 recommended dental soft and thin    Chest X-Rays: no acute process    Prior diet: dental soft with thin      Subjective   Nursing reporting tolerating thin liquids well.  Patient goals: unable to report    Objective:     Oral Musculature Evaluation  · Oral Musculature: WFL  · Dentition: teeth in poor condition  · Mucosal Quality: good  · Oral Labial Strength and Mobility: WFL  · Lingual Strength and Mobility: WFL  · Buccal Strength and Mobility: WFL  · Oral Musculature Comments: grossly intact    Bedside Swallow Eval:   Consistencies Assessed:  · Thin liquids 4oz multiple trials via straw self presented  · Solids X1 cracker     Oral Phase:   · WFL    Pharyngeal Phase:   · no overt clinical signs/symptoms of aspiration    Compensatory Strategies  · None    Treatment: no further ST is warranted    Assessment:     Mani Kong is a 77 y.o. male with medical diagnosis of NSTEMI he presents with functional swallow.     Goals:    SLP Goals     Not  on file          Multidisciplinary Problems (Resolved)        Problem: SLP Goal    Goal Priority Disciplines Outcome   SLP Goal   (Resolved)    Low SLP Outcome(s) achieved   Description:  Pt will tolerate dental soft with thin liquids (goal met 1/9/18)                    Plan:     · Patient to be seen:      · Plan of Care expires:  01/09/18  · Plan of Care reviewed with:      · SLP Follow-Up:  No       Discharge recommendations:    no further ST is warranted  Barriers to Discharge:  None    Time Tracking:     SLP Treatment Date:   01/09/18  Speech Start Time:  1900  Speech Stop Time:  1910     Speech Total Time (min):  10 min    Billable Minutes: Eval Swallow and Oral Function 10    Vivian Lewis CCC-SLP  01/09/2018

## 2018-01-10 NOTE — PROGRESS NOTES
Call placed to Marla with [facility] to notify her that pt is ready for discharge today back to the facility and that the orders are being sent over for review via [facility].    1040- daughter Romina notified that the pt will be discharging back to [facility].  Verbalized understanding and will bring clothes to the pt at [facility].    1043- message sent to Marla to follow up on if orders sent have been received and reviewed.  TN to continue to follow for response    1047- TN discussed method of transportation for pt if he can go via w/c van or if he will need an ambulance.  Lani informed TN that the pt can transport via w/c van.    1049- message sent to Derrek with [facility] to notify that orders for discharge back to NH have been sent via [facility] for review and to please follow up with TN when complete.    1052- response from Marla stating that she is out of the building on a home eval and that as soon as she returns to the facility and reviews the orders she will contact TN with call report information.  TN to continue to follow    1140- message sent to Derrek/Marla to notify of need for pt to discharge as soon as possible and if anyone other than Marla or Derrek can review the orders for the pt to return.    1142- message sent back from Derrek stating that he no longer works at [facility] but that he forwarded the message to Marla.    1240- message from Marla with [facility] stating that they are reviewing the orders right now and will get back with TN when review complete.    1308- call placed to Marla to follow up on call report information for pt to return to facility.  No answer at this time message left TN to follow.      1311- call placed [facility] to schedule w/c transportation back to [facility].  Transportation scheduled for  for 2:30pm.  TN still awaiting call report information       1313- message received from Marla at [facility] asking if pt is still on isolation? TN advised that the  pt is currently in the hospital but that he is on tamiflu and has been afebrile for 24 hours.  Pending response from Marla    1328- sent message to Marla with [facility] to follow up on information provided and attempt to obtain call report information.  TN to continue to follow    1332- call placed to Marla with [facility] on personal cell phone with no answer.  Message left and will continue to follow    1351- call report received from Marla with [facility] nurse to call report to 5th floor nurses station in 10 mins    1359- call report information provided to Lani pts nurse

## 2018-01-10 NOTE — PLAN OF CARE
Problem: SLP Goal  Goal: SLP Goal  Pt will tolerate dental soft with thin liquids (goal met 1/9/18)  Outcome: Outcome(s) achieved Date Met: 01/09/18  ST RECS: dental soft with thin liquids, no further ST is warranted.

## 2018-01-10 NOTE — DISCHARGE SUMMARY
[facility] Hospital Medicine  Discharge Summary      Patient Name: Mani Kong  MRN: [id number]  Admission Date: 1/9/2018  Hospital Length of Stay: 1 days  Discharge Date and Time:  01/10/2018 9:50 AM  Attending Physician: Vin Dewey MD   Discharging Provider: Vin Dewey MD  Primary Care Provider: Chaz Shepherd Jr, MD (Inactive)      HPI:   This 77 y.o M with BRAO, DM type II, HTN and PMHx of stroke presents to the ED from [facility] accompanied by a family member for emergent evaluation of a fall x2 PTA. 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 The pt fell again at approximately 1900, resulting in bruising to the left side chest and small laceration to the right side forehead. The pt's family member reports a fever of 103.0 F PTA. Additionally, the pt's family member reports a gradually worsening cough x1 month. The pt denies LOC, chest pain,SOB,diaphoresis, V/D and headache. No prior tx.he has 0.151 with no chest pain,EKG show non specific T wave changes,he has al;so positive influenza and has been started on Tamiflu.chest X ray show no consolidation.    * No surgery found *      Hospital Course:   This 77 y.o M with BRAO, DM type II, HTN and PMHx of stroke presents to the ED from [facility] accompanied by a family member for emergent evaluation of a fall x2 PTA. 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 The pt fell again at approximately 1900, resulting in bruising to the left side chest and small laceration to the right side forehead. The pt's family member reports a fever of 103.0 F PTA. Additionally, the pt's family member reports a gradually worsening cough x1 month. The pt denies LOC, chest pain,SOB,diaphoresis, V/D and headache. No prior tx.he has 0.151 with no chest pain,EKG show  non specific T wave changes,he has been monitored in Telemetry with serial cardiac marks and levels remains plateau,cardiology was consulted and echo was ordered,which showed preserved EF with no wall motion abnormality,he remains chest pain free,elevated Troponin likely duo to demand ischemia from flu.he had also positive influenza and was started on Tamiflu.chest X ray show no consolidation.he was stable on RA with no fever and headache,  He had improvement faster than expected,he passed swallow study.  He has been discharged back to NH with follow up with PCP in next few days.     Consults:   Consults         Status Ordering Provider     Inpatient consult to Cardiology  Once     Provider:  Clayton Escamilla MD    Completed GABRIELLE MCKEON     Inpatient consult to Social Work/Case Management  Once     Provider:  (Not yet assigned)    Acknowledged PAYTON LEMA          No new Assessment & Plan notes have been filed under this hospital service since the last note was generated.  Service: Hospital Medicine    Final Active Diagnoses:    Diagnosis Date Noted POA    PRINCIPAL PROBLEM:  Elevated troponin [R74.8] 01/09/2018 Yes    Influenza A [J10.1] 01/09/2018 Yes    CVA, old, aphasia [I69.320] 01/09/2018 Not Applicable    Uncontrolled type 2 diabetes mellitus with retinopathy [E11.319, E11.65] 01/09/2018 Yes    Essential hypertension [I10] 01/09/2018 Yes    Anticoagulation monitoring, INR range 2-3 [Z79.01] 02/25/2015 Not Applicable    Hemiparesis, aphasia, and dysphagia as late effects of stroke [I69.391, I69.359, I69.320] 02/25/2015 Not Applicable    Hyperlipidemia [E78.5] 02/25/2015 Yes     Chronic    Debility [R53.81] 02/24/2015 Yes    Aphasia [R47.01] 02/21/2015 Yes    Branch retinal artery occlusion [H34.239] 06/12/2013 Yes    Nonproliferative diabetic retinopathy [E11.3299] 07/26/2012 Yes      Problems Resolved During this Admission:    Diagnosis Date Noted Date Resolved POA        Discharged Condition: stable    Disposition: Nursing Facility    Follow Up:  Follow-up Information     Chaz Shepherd Jr, MD In 1 week.    Specialty:  Internal Medicine  Contact information:  [address]  [facility] [id number] [phone]                 Patient Instructions:     Diet Diabetic 2000 Calories   Scheduling Instructions: Dental soft,thin     Activity as tolerated         Significant Diagnostic Studies: Labs:   BMP:   Recent Labs  Lab 01/09/18  0055 01/10/18  0617   * 134*    144   K 4.4 3.9    110   CO2 21* 26   BUN 17 14   CREATININE 1.2 1.0   CALCIUM 9.8 8.7   MG 1.6  --    , CBC   Recent Labs  Lab 01/09/18  0130   WBC 7.38   HGB 11.7*   HCT 35.6*      , INR   Lab Results   Component Value Date    INR 1.1 01/10/2018    INR 1.1 01/09/2018    INR 2.8 (H) 03/09/2015   , Lipid Panel   Lab Results   Component Value Date    CHOL 114 (L) 01/09/2018    HDL 38 (L) 01/09/2018    LDLCALC 61.2 (L) 01/09/2018    TRIG 74 01/09/2018    CHOLHDL 33.3 01/09/2018    and Troponin   Recent Labs  Lab 01/09/18  1228   TROPONINI 0.222*     Microbiology:   Blood Culture   Lab Results   Component Value Date    LABBLOO No Growth to date 01/09/2018    LABBLOO No Growth to date 01/09/2018    and Urine Culture    Lab Results   Component Value Date    LABURIN  02/29/2012     MULTIPLE ORGANISMS ISOLATED. NONE IN PREDOMINANCE. REPEAT IF CLINICALLY NECESSARY.     Radiology: X-Ray: CXR: X-Ray Chest 1 View (CXR):   Results for orders placed or performed during the hospital encounter of 01/09/18   X-Ray Chest 1 View    Narrative    Chest AP single view.  Comparison: 2/2015.    Cardiac silhouette is normal in size.  Mediastinal structures are midline.  Lungs are symmetrically expanded.  No evidence of focal consolidative process, pneumothorax, or significant effusion.  Bones show DJD.  No free air visualized beneath the diaphragm.    Impression    No acute cardiopulmonary process  identified.      Electronically signed by: WALTER LUCERO MD  Date:     01/09/18  Time:    01:31      Cardiac Graphics: Echocardiogram:   2D echo with color flow doppler:   Results for orders placed or performed during the hospital encounter of 01/09/18   2D echo with color flow doppler   Result Value Ref Range    EF 60 55 - 65    Diastolic Dysfunction No     Est. PA Systolic Pressure 35.67     Pericardial Effusion NONE     Mitral Valve Mobility NORMAL     Tricuspid Valve Regurgitation TRIVIAL TO MILD        Pending Diagnostic Studies:     Procedure Component Value Units Date/Time    CBC auto differential [[id number]] Collected:  01/10/18 0617    Order Status:  Sent Lab Status:  In process Updated:  01/10/18 0729    Specimen:  Blood from Blood          Medications:  Reconciled Home Medications:   Current Discharge Medication List      START taking these medications    Details   metoprolol tartrate (LOPRESSOR) 25 MG tablet Take 1 tablet (25 mg total) by mouth 2 (two) times daily.  Qty: 60 tablet, Refills: 11      oseltamivir (TAMIFLU) 75 MG capsule Take 1 capsule (75 mg total) by mouth once daily.  Qty: 8 capsule, Refills: 0         CONTINUE these medications which have NOT CHANGED    Details   amlodipine (NORVASC) 10 MG tablet Take 1 tablet (10 mg total) by mouth once daily.  Qty: 30 tablet, Refills: 6    Associated Diagnoses: Hypertension      aspirin (ECOTRIN) 81 MG EC tablet Take 81 mg by mouth once daily.      atorvastatin (LIPITOR) 40 MG tablet Take 1 tablet (40 mg total) by mouth once daily.  Qty: 30 tablet, Refills: 11      ferrous gluconate (FERGON) 324 MG tablet Take 324 mg by mouth daily with breakfast.      metformin (GLUCOPHAGE) 1000 MG tablet Take 1 tablet (1,000 mg total) by mouth 2 (two) times daily.  Qty: 60 tablet, Refills: 5      traZODone (DESYREL) 50 MG tablet Take 50 mg by mouth every evening.      blood sugar diagnostic Strp Use with [employer] Next EZ machine. Test three times daily.  Qty: 300  "each, Refills: 11      cetirizine (ZYRTEC) 5 MG tablet Take 1 tablet (5 mg total) by mouth once daily.  Refills: 0      insulin aspart (NOVOLOG) 100 unit/mL injection Inject 8 units subq TID with meals and ACHS per sliding scale: 0 - 60:OJ or glucose tablet;=No insulin;201-250=2 units;251-300=4 units;301-350=6 units;351-400=8 units;>400=10 units then call MD      insulin glargine (LANTUS SOLOSTAR) 100 unit/mL (3 mL) InPn pen Inject 12 Units into the skin every evening. Take twenty units every evening    Associated Diagnoses: Diabetes      !! insulin needles, disposable, (BD ULTRA-FINE CHETNA PEN NEEDLES) 32 x 5/32 " Ndle Use with flex pens before meals  Qty: 100 each, Refills: 12    Associated Diagnoses: Diabetes      !! insulin needles, disposable, (BD ULTRA-FINE CHETNA PEN NEEDLES) 32 x 5/32 " Ndle Use with flexpen  Qty: 100 each, Refills: 11      lancets (LANCETS,ULTRA THIN) Misc Use with Contour Next EZ machine. Test three times daily  Qty: 300 each, Refills: 11      lisinopril 10 MG tablet Take 1 tablet (10 mg total) by mouth once daily.  Qty: 30 tablet, Refills: 1      SYRINGE W-NEEDLE,DISPOSAB,1ML (SYRINGE WITH NEEDLE, DISP,) 1 mL 29 x 1/2" Syrg Use as directed for insulin dosage before meals and at bedtime  Qty: 100 Syringe, Refills: 11    Associated Diagnoses: Diabetes mellitus type II      warfarin (COUMADIN) 5 MG tablet Take 1 & 1/2 tab (7.5mg) by mouth Mon-Wed-Fri and 1 tab (5mg) all other days of the week as directed by [facility]  Qty: 40 tablet, Refills: 1       !! - Potential duplicate medications found. Please discuss with provider.          Indwelling Lines/Drains at time of discharge:   Lines/Drains/Airways          No matching active lines, drains, or airways          Time spent on the discharge of patient: 30  minutes  Patient was seen and examined on the date of discharge and determined to be suitable for discharge.         Vin Dewey MD  Department of Hospital " Medicine  [facility]

## 2018-01-10 NOTE — HOSPITAL COURSE
This 77 y.o M with BRAO, DM type II, HTN and PMHx of stroke presents to the ED from [facility] accompanied by a family member for emergent evaluation of a fall x2 PTA. 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 The pt fell again at approximately 1900, resulting in bruising to the left side chest and small laceration to the right side forehead. The pt's family member reports a fever of 103.0 F PTA. Additionally, the pt's family member reports a gradually worsening cough x1 month. The pt denies LOC, chest pain,SOB,diaphoresis, V/D and headache. No prior tx.he has 0.151 with no chest pain,EKG show non specific T wave changes,he has been monitored in Telemetry with serial cardiac marks and levels remains plateau,cardiology was consulted and echo was ordered,which showed preserved EF with no wall motion abnormality,he remains chest pain free,elevated Troponin likely duo to demand ischemia from flu.he had also positive influenza and was started on Tamiflu.chest X ray show no consolidation.he was stable on RA with no fever and headache,  He had improvement faster than expected,he passed swallow study.  He has been discharged back to NH with follow up with PCP in next few days.

## 2018-01-10 NOTE — PROGRESS NOTES
Patient returning to [facility] Report called and accepted by Eloina CANCINO LPN. Waiting for transportation to arrive. Tele remove  SL removed..

## 2018-01-10 NOTE — PLAN OF CARE
Problem: Pressure Ulcer Risk (Anthony Scale) (Adult,Obstetrics,Pediatric)  Goal: Skin Integrity  Patient will demonstrate the desired outcomes by discharge/transition of care.   Outcome: Ongoing (interventions implemented as appropriate)   01/09/18 1806   Pressure Ulcer Risk (Anthony Scale) (Adult,Obstetrics,Pediatric)   Skin Integrity making progress toward outcome

## 2018-01-10 NOTE — PLAN OF CARE
[facility]     Department of Hospital Medicine     [address] [place] [address]     [phone] after hours  [phone] fax       NURSING HOME ORDERS    01/10/2018    Admit to Nursing Home:  Regular Bed                                              Diagnoses:  Active Hospital Problems    Diagnosis  POA    *Elevated troponin [R74.8]  Yes    Influenza A [J10.1]  Yes    CVA, old, aphasia [I69.320]  Not Applicable    Uncontrolled type 2 diabetes mellitus with retinopathy [E11.319, E11.65]  Yes    Essential hypertension [I10]  Yes    Anticoagulation monitoring, INR range 2-3 [Z79.01]  Not Applicable    Hemiparesis, aphasia, and dysphagia as late effects of stroke [I69.391, I69.359, I69.320]  Not Applicable    Hyperlipidemia [E78.5]  Yes     Chronic    Debility [R53.81]  Yes    Aphasia [R47.01]  Yes    Branch retinal artery occlusion [H34.239]  Yes    Nonproliferative diabetic retinopathy [E11.3299]  Yes      Resolved Hospital Problems    Diagnosis Date Resolved POA   No resolved problems to display.       Patient is homebound due to:  Elevated troponin    Allergies:Review of patient's allergies indicates:  No Known Allergies    Vitals:       Every shift (Skilled Nursing patients)    Diet: ADA 2000,dental soft,thin.aspiration precaution     Acitivities:      - Up in a chair each morning as tolerated   - Weight bearing: as tolerated.    LABS:   PT-INR each Monday and Thursday      Nursing Precautions:     - Aspiration precautions:                        -  Upright 90 degrees befor during and after meals             -  Suction at bedside          - Fall precautions per nursing home protocol     - Decubitus precautions:        -  for positioning   - Pressure reducing foam mattress   - Turn patient every two hours. Use wedge pillows to anchor patient        MISCELLANEOUS CARE:                   Routine Skin for Bedridden Patients:  Apply moisture  barrier cream to all    skin folds and wet areas in perineal area daily and after baths and                           all bowel movements.                                     DIABETES CARE:       Check blood sugar:    Fingerstick blood sugar a.m and p.m.    Fingerstick blood sugar AC and HS   Fingerstick blood sugar every 6 hours if unable to eat      Report CBG < 60 or > 400 to physician.                                          Insulin Sliding Scale          Glucose  Novolog Insulin Subcutaneous        0 - 60   Orange juice or glucose tablet, hold insulin      No insulin   201-250  2 units   251-300  4 units   301-350  6 units   351-400  8 units   >400   10 units then call physician      Medications: Discontinue all previous medication orders, if any. See new list below.     Mani Kong   Home Medication Instructions BLAS:[id number]    Printed on:01/10/18 1046   Medication Information                      amlodipine (NORVASC) 10 MG tablet  Take 1 tablet (10 mg total) by mouth once daily.             aspirin (ECOTRIN) 81 MG EC tablet  Take 81 mg by mouth once daily.             atorvastatin (LIPITOR) 40 MG tablet  Take 1 tablet (40 mg total) by mouth once daily.             blood sugar diagnostic Strp  Use with Contour Next EZ machine. Test three times daily.             cetirizine (ZYRTEC) 5 MG tablet  Take 1 tablet (5 mg total) by mouth once daily.             ferrous gluconate (FERGON) 324 MG tablet  Take 324 mg by mouth daily with breakfast.                          insulin glargine (LANTUS SOLOSTAR) 100 unit/mL (3 mL) InPn pen  Inject 12 Units into the skin every evening.                                                    lisinopril 10 MG tablet  Take 1 tablet (10 mg total) by mouth once daily.             metformin (GLUCOPHAGE) 1000 MG tablet  Take 1 tablet (1,000 mg total) by mouth 2 (two) times daily.             metoprolol tartrate (LOPRESSOR) 25 MG tablet  Take 1 tablet (25 mg total) by mouth 2  (two) times daily.             oseltamivir (TAMIFLU) 75 MG capsule  Take 1 capsule (75 mg total) by mouth once daily.  Until 1.14.18                         traZODone (DESYREL) 50 MG tablet  Take 50 mg by mouth every evening.             warfarin (COUMADIN) 5 MG tablet  Take 1 & 1/2 tab (7.5mg) by mouth Mon-Wed-Fri and 1 tab (5mg) all other days of the week as directed by [facility]                       _________________________________  Vin Dewey MD  01/10/2018

## 2018-01-10 NOTE — PLAN OF CARE
Problem: Infection, Risk/Actual (Adult)  Goal: Infection Prevention/Resolution  Patient will demonstrate the desired outcomes by discharge/transition of care.    01/09/18 5322   Infection, Risk/Actual (Adult)   Infection Prevention/Resolution making progress toward outcome

## 2018-01-10 NOTE — PLAN OF CARE
01/10/18 1527   Final Note   Assessment Type Final Discharge Note   Discharge Disposition Nurse  ([facility] Follow Up  Appt(s) scheduled? Yes   Discharge plans and expectations educations in teach back method with documentation complete? Yes   [facility] Referral Info   Post Acute Recommendation Other     Follow-up Information     Chaz Shepherd Jr, MD In 1 week.    Specialty:  Internal Medicine  Contact information:  [address]  [place] [address] [phone]

## 2018-01-14 LAB
BACTERIA BLD CULT: NORMAL
BACTERIA BLD CULT: NORMAL

## 2018-01-31 NOTE — PHYSICIAN QUERY
PT Name: Mani Kong  MR #: [id number]     Physician Query Form - Documentation Clarification      CDS/: Hitesh Saavedra Jr RN              Contact information:ext [phone]    This form is a permanent document in the medical record.     Query Date: January 31, 2018    By submitting this query, we are merely seeking further clarification of documentation. Please utilize your independent clinical judgment when addressing the question(s) below.    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  Please Further specify the Uncontrolled Type 2 Diabetes    Provider Use Only    (  x  )Type 2 Diabetes with Hyperglycemia    (    )Other____________________________________________                                                                                                           [  ] Clinically undetermined

## 2019-06-30 ENCOUNTER — HOSPITAL ENCOUNTER (EMERGENCY)
Facility: HOSPITAL | Age: 79
Discharge: HOME | End: 2019-06-30
Attending: EMERGENCY MEDICINE
Payer: MEDICARE

## 2019-06-30 VITALS
DIASTOLIC BLOOD PRESSURE: 77 MMHG | WEIGHT: 149 LBS | HEIGHT: 67 IN | RESPIRATION RATE: 18 BRPM | TEMPERATURE: 99 F | OXYGEN SATURATION: 100 % | SYSTOLIC BLOOD PRESSURE: 166 MMHG | BODY MASS INDEX: 23.39 KG/M2 | HEART RATE: 72 BPM

## 2019-06-30 DIAGNOSIS — R82.81 PYURIA: ICD-10-CM

## 2019-06-30 DIAGNOSIS — M25.552 LEFT HIP PAIN: Primary | ICD-10-CM

## 2019-06-30 LAB
BACTERIA #/AREA URNS AUTO: ABNORMAL /HPF
BILIRUB UR QL STRIP: NEGATIVE
BUN SERPL-MCNC: 18 MG/DL (ref 6–30)
CHLORIDE SERPL-SCNC: 109 MMOL/L (ref 95–110)
CLARITY UR REFRACT.AUTO: ABNORMAL
COLOR UR AUTO: YELLOW
CREAT SERPL-MCNC: 1 MG/DL (ref 0.5–1.4)
GLUCOSE SERPL-MCNC: 161 MG/DL (ref 70–110)
GLUCOSE UR QL STRIP: ABNORMAL
HCT VFR BLD CALC: 36 %PCV (ref 36–54)
HGB UR QL STRIP: NEGATIVE
HYALINE CASTS UR QL AUTO: 0 /LPF
KETONES UR QL STRIP: NEGATIVE
LEUKOCYTE ESTERASE UR QL STRIP: ABNORMAL
MICROSCOPIC COMMENT: ABNORMAL
NITRITE UR QL STRIP: NEGATIVE
PH UR STRIP: 5 [PH] (ref 5–8)
POC IONIZED CALCIUM: 1.18 MMOL/L (ref 1.06–1.42)
POC TCO2 (MEASURED): 21 MMOL/L (ref 23–29)
POTASSIUM BLD-SCNC: 3.9 MMOL/L (ref 3.5–5.1)
PROT UR QL STRIP: ABNORMAL
RBC #/AREA URNS AUTO: 2 /HPF (ref 0–4)
SAMPLE: ABNORMAL
SODIUM BLD-SCNC: 144 MMOL/L (ref 136–145)
SP GR UR STRIP: 1.01 (ref 1–1.03)
URN SPEC COLLECT METH UR: ABNORMAL
WBC #/AREA URNS AUTO: >100 /HPF (ref 0–5)
YEAST UR QL AUTO: ABNORMAL

## 2019-06-30 PROCEDURE — 99283 PR EMERGENCY DEPT VISIT,LEVEL III: ICD-10-PCS | Mod: ,,, | Performed by: EMERGENCY MEDICINE

## 2019-06-30 PROCEDURE — 96374 THER/PROPH/DIAG INJ IV PUSH: CPT

## 2019-06-30 PROCEDURE — 81001 URINALYSIS AUTO W/SCOPE: CPT

## 2019-06-30 PROCEDURE — 87086 URINE CULTURE/COLONY COUNT: CPT

## 2019-06-30 PROCEDURE — 87077 CULTURE AEROBIC IDENTIFY: CPT

## 2019-06-30 PROCEDURE — 80047 BASIC METABLC PNL IONIZED CA: CPT

## 2019-06-30 PROCEDURE — 99283 EMERGENCY DEPT VISIT LOW MDM: CPT | Mod: ,,, | Performed by: EMERGENCY MEDICINE

## 2019-06-30 PROCEDURE — 63600175 PHARM REV CODE 636 W HCPCS: Performed by: EMERGENCY MEDICINE

## 2019-06-30 PROCEDURE — 99284 EMERGENCY DEPT VISIT MOD MDM: CPT | Mod: 25

## 2019-06-30 PROCEDURE — 87186 SC STD MICRODIL/AGAR DIL: CPT

## 2019-06-30 PROCEDURE — 87088 URINE BACTERIA CULTURE: CPT

## 2019-06-30 PROCEDURE — 82962 GLUCOSE BLOOD TEST: CPT

## 2019-06-30 PROCEDURE — 25000003 PHARM REV CODE 250: Performed by: EMERGENCY MEDICINE

## 2019-06-30 RX ORDER — DIAZEPAM 2 MG/1
2 TABLET ORAL EVERY 6 HOURS PRN
Qty: 12 TABLET | Refills: 0 | Status: SHIPPED | OUTPATIENT
Start: 2019-06-30 | End: 2019-07-03

## 2019-06-30 RX ORDER — SULFAMETHOXAZOLE AND TRIMETHOPRIM 800; 160 MG/1; MG/1
1 TABLET ORAL
Status: COMPLETED | OUTPATIENT
Start: 2019-06-30 | End: 2019-06-30

## 2019-06-30 RX ORDER — SULFAMETHOXAZOLE AND TRIMETHOPRIM 800; 160 MG/1; MG/1
1 TABLET ORAL 2 TIMES DAILY
Qty: 14 TABLET | Refills: 0 | Status: SHIPPED | OUTPATIENT
Start: 2019-06-30 | End: 2019-07-07

## 2019-06-30 RX ORDER — KETOROLAC TROMETHAMINE 30 MG/ML
15 INJECTION, SOLUTION INTRAMUSCULAR; INTRAVENOUS
Status: COMPLETED | OUTPATIENT
Start: 2019-06-30 | End: 2019-06-30

## 2019-06-30 RX ORDER — MELOXICAM 15 MG/1
7.5 TABLET ORAL DAILY
Qty: 7 TABLET | Refills: 0 | Status: SHIPPED | OUTPATIENT
Start: 2019-06-30

## 2019-06-30 RX ADMIN — KETOROLAC TROMETHAMINE 15 MG: 30 INJECTION, SOLUTION INTRAMUSCULAR at 06:06

## 2019-06-30 RX ADMIN — SULFAMETHOXAZOLE AND TRIMETHOPRIM 1 TABLET: 800; 160 TABLET ORAL at 07:06

## 2019-06-30 NOTE — LETTER
July 3, 2019    Mani Kong  [address] [facility] [address]                   [address]  [facility] [address]  Phone: [phone]  Fax: [phone]   Dear Mr. Mani Kong:    We were unable to contact you by telephone to discuss test results from a recent emergency room visit.  Please call the emergency room at [phone] to discusse these results.        Sincerely,        Nasreen Amaya PA-C

## 2019-06-30 NOTE — ED PROVIDER NOTES
Encounter Date: 2019    SCRIBE #1 NOTE: I, Edenilson Lion, am scribing for, and in the presence of,  Jony Byrd MD. I have scribed the following portions of the note -       History     Chief Complaint   Patient presents with    Back Pain     L lower back, not able to speak due to stroke yrs ago     78 y/o M, former smoker, nonverbal and hemiparetic secondary to CVA, with history of NIDDM, HTN, and HLD presents with c/o lower lumbar pain radiating distally through posterior LE. Patient is accompanied by daughter who reports patient is resident of a nursing facility. Daughter noticed patient seemed uncomfortable and inquired if patient would like to present to ED. No other known symptoms.     The history is provided by a relative and medical records. The history is limited by the condition of the patient.     Review of patient's allergies indicates:  No Known Allergies  Past Medical History:   Diagnosis Date    BRAO (branch retinal artery occlusion)     OS    Diabetes mellitus type II     Diabetic macular edema of right eye     Diabetic retinopathy     Encounter for blood transfusion     Glaucoma     High cholesterol     Hypertension     Stroke      Past Surgical History:   Procedure Laterality Date    BACK SURGERY      CATARACT EXTRACTION W/  INTRAOCULAR LENS IMPLANT Bilateral     PANRETINAL PHOTOCOAGULATION      Right eye x 2 12 & 3/26/12     Family History   Problem Relation Age of Onset    Diabetes Sister     Diabetes Sister     Diabetes Maternal Grandmother     Glaucoma Neg Hx     Blindness Neg Hx     Macular degeneration Neg Hx     Retinal detachment Neg Hx      Social History     Tobacco Use    Smoking status: Former Smoker     Last attempt to quit: 10/10/2000     Years since quittin.7    Smokeless tobacco: Never Used   Substance Use Topics    Alcohol use: Yes     Alcohol/week: 0.5 oz     Types: 1 drink(s) per week     Comment: very seldom    Drug use: No      Review of Systems   Respiratory: Positive for cough (baseline).    Musculoskeletal: Positive for back pain.   All other systems reviewed and are negative.      Physical Exam     Initial Vitals [06/30/19 1627]   BP Pulse Resp Temp SpO2   (!) 143/67 74 18 98.5 °F (36.9 °C) 98 %      MAP       --         Physical Exam    Nursing note and vitals reviewed.  Constitutional:   78 y/o  Man no acute distress noted   HENT:   Head: Normocephalic and atraumatic.   Eyes: EOM are normal.   Neck: No tracheal deviation present.   Cardiovascular: Normal rate, regular rhythm and intact distal pulses.   Pulmonary/Chest: Breath sounds normal. No stridor. No respiratory distress.   Abdominal: Soft. There is no tenderness. There is no rebound.   Musculoskeletal:   The right upper extremity is held in contraction with 1/5 strength of the right lower extremity consistent with chronic hemiparesis status post CVA.  There is mild tenderness of the left lower lumbar paraspinous musculature and right hip without bony deformity or midline tenderness to palpation   Neurological: He is alert and oriented to person, place, and time. GCS score is 15. GCS eye subscore is 4. GCS verbal subscore is 5. GCS motor subscore is 6.   Skin: Skin is warm and dry.   Psychiatric:   Patient with persistent aphasia, responds to questioning and follows commands         ED Course   Procedures  Labs Reviewed   URINALYSIS, REFLEX TO URINE CULTURE - Abnormal; Notable for the following components:       Result Value    Appearance, UA Cloudy (*)     Protein, UA 1+ (*)     Glucose, UA 3+ (*)     Leukocytes, UA 3+ (*)     All other components within normal limits    Narrative:     Preferred Collection Type->Urine, Clean Catch  yellow and grey   URINALYSIS MICROSCOPIC - Abnormal; Notable for the following components:    WBC, UA >100 (*)     Bacteria Many (*)     All other components within normal limits    Narrative:     Preferred Collection Type->Urine,  Clean Catch  yellow and grey   ISTAT PROCEDURE - Abnormal; Notable for the following components:    POC Glucose 161 (*)     POC TCO2 (MEASURED) 21 (*)     All other components within normal limits   CULTURE, URINE   ISTAT CHEM8          Imaging Results          X-Ray Hip 2 View Left (Final result)  Result time 06/30/19 18:30:48    Final result by Oumar Wells MD (06/30/19 18:30:48)                 Impression:      1. No acute displaced fracture or dislocation of the left hip.      Electronically signed by: Oumar Wells MD  Date:    06/30/2019  Time:    18:30             Narrative:    EXAMINATION:  XR HIP 2 VIEW LEFT    CLINICAL HISTORY:  Pain in left hip    TECHNIQUE:  AP view of the pelvis and frog leg lateral view of the left hip were performed.    COMPARISON:  None    FINDINGS:  Two views.    The bilateral sacroiliac joints are grossly intact.  The pubic symphysis is intact.  Allowing for positioning, the bilateral femoral heads maintain appropriate relationship with their respective acetabula.                                 Medical Decision Making:   History:   Old Medical Records: I decided to obtain old medical records.  Clinical Tests:   Lab Tests: Reviewed and Ordered  Radiological Study: Ordered and Reviewed              Attending Attestation:           Physician Attestation for Scribe:  Physician Attestation Statement for Scribe #1: I, Jony Byrd MD, reviewed documentation, as scribed by Edenilson Lion in my presence, and it is both accurate and complete.         Attending ED Notes:   Physical exam consistent with mild lumbar strain with sciatic component with radiation to the left hip.  Plain films of left hip do not reveal any evidence of acute fracture or dislocation.  Urinalysis notably exhibits significant pyuria and bacteriuria without evidence of nitrite.  I will initiate an outpatient course of Bactrim DS while awaiting cultures and sensitivities.  Findings have been discussed  with the patient and accompanying daughter, and all questions have been answered to their satisfaction.  He will be discharged home in improved condition with prescription for meloxicam 7.5 mg to be taken daily for the next 7 days, Valium 2 mg to be taken every 6 hr as needed for muscle spasm for the next 3 days, as well as Bactrim DS to be taken twice daily for the next 7 days with primary care follow-up preferably within 5 days and return to the ED for failure to improve or worsening of current condition             Clinical Impression:       ICD-10-CM ICD-9-CM   1. Left hip pain M25.552 719.45   2. Pyuria N39.0 791.9   2      Disposition:   Disposition: Discharged  Condition: Stable                        Jony Byrd MD  06/30/19 0181

## 2019-06-30 NOTE — ED NOTES
LOC: The patient is awake, alert, unable to fully assess orientation. Affect is appropriate.  Non-verbal due to previous stroke .     APPEARANCE: Patient resting comfortably in no acute distress.  Patient is clean and well groomed.    SKIN: The skin is warm and dry; color consistent with ethnicity.  Patient has normal skin turgor and moist mucus membranes.  Skin intact; no breakdown or bruising noted.     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  generalized weakness noted.     RESPIRATORY: Airway is open and patent. Respirations spontaneous, even, easy, and non-labored.  Patient has a normal effort and rate.  No accessory muscle use noted. Denies cough.     CARDIAC:  Normal rhythm and rate noted.  No peripheral edema noted. No complaints of chest pain.      ABDOMEN: Soft and non tender to palpation.  No distention noted.     NEUROLOGIC: Eyes open spontaneously.  Behavior appropriate to situation.  Follows commands; facial expression symmetrical.  Purposeful motor response noted; normal sensation in all extremities.

## 2019-06-30 NOTE — ED TRIAGE NOTES
Daughter states pt was resident [facility] today, went for home visit with daughter she noticed pt  Had increased weakness and decreased oral intake. Daughter denies N/V/D, temps, or recent illness, Hx of stroke 4-5 years ago, non-verbal, right arm and hand contracted, right leg paralyzed, wheelchair bound, pt nods and gestures to back pain and left hip pain.

## 2019-07-01 LAB — POCT GLUCOSE: 134 MG/DL (ref 70–110)

## 2019-07-01 NOTE — ED NOTES
Pt cleaned and diaper changed, pt assisted without complication and pulled himself up in bed, NAD.

## 2019-07-03 LAB — BACTERIA UR CULT: ABNORMAL

## 2020-07-22 ENCOUNTER — LAB VISIT (OUTPATIENT)
Dept: LAB | Facility: OTHER | Age: 80
End: 2020-07-22
Payer: MEDICARE

## 2020-07-22 DIAGNOSIS — Z20.822 SUSPECTED COVID-19 VIRUS INFECTION: ICD-10-CM

## 2020-07-22 DIAGNOSIS — Z03.818 ENCOUNTER FOR OBSERVATION FOR SUSPECTED EXPOSURE TO OTHER BIOLOGICAL AGENTS RULED OUT: ICD-10-CM

## 2020-07-22 PROCEDURE — U0003 INFECTIOUS AGENT DETECTION BY NUCLEIC ACID (DNA OR RNA); SEVERE ACUTE RESPIRATORY SYNDROME CORONAVIRUS 2 (SARS-COV-2) (CORONAVIRUS DISEASE [COVID-19]), AMPLIFIED PROBE TECHNIQUE, MAKING USE OF HIGH THROUGHPUT TECHNOLOGIES AS DESCRIBED BY CMS-2020-01-R: HCPCS

## 2020-07-26 LAB — SARS-COV-2 RNA RESP QL NAA+PROBE: NEGATIVE

## 2022-03-14 ENCOUNTER — LAB VISIT (OUTPATIENT)
Dept: LAB | Facility: OTHER | Age: 82
End: 2022-03-14
Payer: MEDICARE

## 2022-03-14 DIAGNOSIS — Z20.822 ENCOUNTER FOR LABORATORY TESTING FOR COVID-19 VIRUS: ICD-10-CM

## 2022-03-14 PROCEDURE — U0003 INFECTIOUS AGENT DETECTION BY NUCLEIC ACID (DNA OR RNA); SEVERE ACUTE RESPIRATORY SYNDROME CORONAVIRUS 2 (SARS-COV-2) (CORONAVIRUS DISEASE [COVID-19]), AMPLIFIED PROBE TECHNIQUE, MAKING USE OF HIGH THROUGHPUT TECHNOLOGIES AS DESCRIBED BY CMS-2020-01-R: HCPCS | Performed by: EMERGENCY MEDICINE

## 2022-03-15 LAB
SARS-COV-2 RNA RESP QL NAA+PROBE: NOT DETECTED
SARS-COV-2- CYCLE NUMBER: NORMAL

## 2022-03-21 ENCOUNTER — LAB VISIT (OUTPATIENT)
Dept: LAB | Facility: OTHER | Age: 82
End: 2022-03-21
Payer: MEDICARE

## 2022-03-21 DIAGNOSIS — Z20.822 ENCOUNTER FOR LABORATORY TESTING FOR COVID-19 VIRUS: ICD-10-CM

## 2022-03-21 PROCEDURE — U0003 INFECTIOUS AGENT DETECTION BY NUCLEIC ACID (DNA OR RNA); SEVERE ACUTE RESPIRATORY SYNDROME CORONAVIRUS 2 (SARS-COV-2) (CORONAVIRUS DISEASE [COVID-19]), AMPLIFIED PROBE TECHNIQUE, MAKING USE OF HIGH THROUGHPUT TECHNOLOGIES AS DESCRIBED BY CMS-2020-01-R: HCPCS | Performed by: EMERGENCY MEDICINE

## 2022-03-22 LAB
SARS-COV-2 RNA RESP QL NAA+PROBE: NOT DETECTED
SARS-COV-2- CYCLE NUMBER: NORMAL

## 2022-03-28 ENCOUNTER — LAB VISIT (OUTPATIENT)
Dept: LAB | Facility: OTHER | Age: 82
End: 2022-03-28
Payer: MEDICARE

## 2022-03-28 DIAGNOSIS — Z20.822 ENCOUNTER FOR LABORATORY TESTING FOR COVID-19 VIRUS: ICD-10-CM

## 2022-03-28 PROCEDURE — U0003 INFECTIOUS AGENT DETECTION BY NUCLEIC ACID (DNA OR RNA); SEVERE ACUTE RESPIRATORY SYNDROME CORONAVIRUS 2 (SARS-COV-2) (CORONAVIRUS DISEASE [COVID-19]), AMPLIFIED PROBE TECHNIQUE, MAKING USE OF HIGH THROUGHPUT TECHNOLOGIES AS DESCRIBED BY CMS-2020-01-R: HCPCS | Performed by: EMERGENCY MEDICINE

## 2022-03-29 LAB
SARS-COV-2 RNA RESP QL NAA+PROBE: NOT DETECTED
SARS-COV-2- CYCLE NUMBER: NORMAL

## 2022-04-04 ENCOUNTER — LAB VISIT (OUTPATIENT)
Dept: LAB | Facility: OTHER | Age: 82
End: 2022-04-04
Payer: MEDICARE

## 2022-04-04 DIAGNOSIS — Z20.822 ENCOUNTER FOR LABORATORY TESTING FOR COVID-19 VIRUS: ICD-10-CM

## 2022-04-04 PROCEDURE — U0003 INFECTIOUS AGENT DETECTION BY NUCLEIC ACID (DNA OR RNA); SEVERE ACUTE RESPIRATORY SYNDROME CORONAVIRUS 2 (SARS-COV-2) (CORONAVIRUS DISEASE [COVID-19]), AMPLIFIED PROBE TECHNIQUE, MAKING USE OF HIGH THROUGHPUT TECHNOLOGIES AS DESCRIBED BY CMS-2020-01-R: HCPCS | Performed by: EMERGENCY MEDICINE

## 2022-04-05 LAB
SARS-COV-2 RNA RESP QL NAA+PROBE: NOT DETECTED
SARS-COV-2- CYCLE NUMBER: NORMAL

## 2022-04-11 ENCOUNTER — LAB VISIT (OUTPATIENT)
Dept: LAB | Facility: OTHER | Age: 82
End: 2022-04-11
Payer: MEDICARE

## 2022-04-11 DIAGNOSIS — Z20.822 ENCOUNTER FOR LABORATORY TESTING FOR COVID-19 VIRUS: ICD-10-CM

## 2022-04-11 PROCEDURE — U0003 INFECTIOUS AGENT DETECTION BY NUCLEIC ACID (DNA OR RNA); SEVERE ACUTE RESPIRATORY SYNDROME CORONAVIRUS 2 (SARS-COV-2) (CORONAVIRUS DISEASE [COVID-19]), AMPLIFIED PROBE TECHNIQUE, MAKING USE OF HIGH THROUGHPUT TECHNOLOGIES AS DESCRIBED BY CMS-2020-01-R: HCPCS | Performed by: EMERGENCY MEDICINE

## 2022-04-12 LAB
SARS-COV-2 RNA RESP QL NAA+PROBE: NOT DETECTED
SARS-COV-2- CYCLE NUMBER: NORMAL

## 2022-04-25 ENCOUNTER — LAB VISIT (OUTPATIENT)
Dept: LAB | Facility: OTHER | Age: 82
End: 2022-04-25
Payer: MEDICARE

## 2022-04-25 DIAGNOSIS — Z20.822 ENCOUNTER FOR LABORATORY TESTING FOR COVID-19 VIRUS: ICD-10-CM

## 2022-04-25 PROCEDURE — U0003 INFECTIOUS AGENT DETECTION BY NUCLEIC ACID (DNA OR RNA); SEVERE ACUTE RESPIRATORY SYNDROME CORONAVIRUS 2 (SARS-COV-2) (CORONAVIRUS DISEASE [COVID-19]), AMPLIFIED PROBE TECHNIQUE, MAKING USE OF HIGH THROUGHPUT TECHNOLOGIES AS DESCRIBED BY CMS-2020-01-R: HCPCS | Performed by: EMERGENCY MEDICINE

## 2022-04-26 LAB
SARS-COV-2 RNA RESP QL NAA+PROBE: NOT DETECTED
SARS-COV-2- CYCLE NUMBER: NORMAL